# Patient Record
Sex: MALE | Race: WHITE | Employment: FULL TIME | URBAN - METROPOLITAN AREA
[De-identification: names, ages, dates, MRNs, and addresses within clinical notes are randomized per-mention and may not be internally consistent; named-entity substitution may affect disease eponyms.]

---

## 2021-09-18 VITALS — WEIGHT: 264.6 LBS | HEIGHT: 78 IN | BODY MASS INDEX: 30.61 KG/M2

## 2021-09-18 PROBLEM — K62.5 HEMORRHAGE OF ANUS AND RECTUM: Status: ACTIVE | Noted: 2021-09-18

## 2021-09-18 PROBLEM — N23 RENAL COLIC: Status: ACTIVE | Noted: 2021-09-18

## 2021-09-18 PROBLEM — K64.9 HEMORRHOIDS: Status: ACTIVE | Noted: 2021-09-18

## 2021-09-18 PROBLEM — L65.9 ALOPECIA: Status: ACTIVE | Noted: 2021-09-18

## 2021-09-18 PROBLEM — R10.13 EPIGASTRIC PAIN: Status: ACTIVE | Noted: 2021-09-18

## 2021-09-18 RX ORDER — HYDROCORTISONE ACETATE 25 MG/1
SUPPOSITORY RECTAL
COMMUNITY
Start: 2021-07-20

## 2021-09-18 RX ORDER — MONTELUKAST SODIUM 10 MG/1
10 TABLET ORAL DAILY
COMMUNITY
Start: 2021-09-09

## 2021-09-24 ENCOUNTER — OFFICE VISIT (OUTPATIENT)
Dept: GASTROENTEROLOGY | Age: 51
End: 2021-09-24
Payer: COMMERCIAL

## 2021-09-24 VITALS
HEART RATE: 73 BPM | RESPIRATION RATE: 14 BRPM | WEIGHT: 264.2 LBS | SYSTOLIC BLOOD PRESSURE: 130 MMHG | OXYGEN SATURATION: 97 % | HEIGHT: 78 IN | BODY MASS INDEX: 30.57 KG/M2 | TEMPERATURE: 97.8 F | DIASTOLIC BLOOD PRESSURE: 80 MMHG

## 2021-09-24 DIAGNOSIS — K21.9 GASTROESOPHAGEAL REFLUX DISEASE, UNSPECIFIED WHETHER ESOPHAGITIS PRESENT: ICD-10-CM

## 2021-09-24 DIAGNOSIS — Z12.11 COLON CANCER SCREENING: ICD-10-CM

## 2021-09-24 DIAGNOSIS — K64.9 HEMORRHOIDS, UNSPECIFIED HEMORRHOID TYPE: Primary | ICD-10-CM

## 2021-09-24 DIAGNOSIS — K62.5 HEMORRHAGE OF ANUS AND RECTUM: ICD-10-CM

## 2021-09-24 PROCEDURE — 99214 OFFICE O/P EST MOD 30 MIN: CPT | Performed by: INTERNAL MEDICINE

## 2021-09-24 RX ORDER — OMEPRAZOLE 40 MG/1
40 CAPSULE, DELAYED RELEASE ORAL DAILY
Qty: 30 CAPSULE | Refills: 5 | Status: SHIPPED | OUTPATIENT
Start: 2021-09-24

## 2021-09-24 RX ORDER — POLYETHYLENE GLYCOL 3350 17 G/17G
POWDER, FOR SOLUTION ORAL
Qty: 510 G | Refills: 0 | Status: SHIPPED | OUTPATIENT
Start: 2021-09-24 | End: 2021-10-19

## 2021-09-24 NOTE — PROGRESS NOTES
1. Have you been to the ER, urgent care clinic since your last visit? Hospitalized since your last visit? no    2. Have you seen or consulted any other health care providers outside of the [address] [place] [address] since your last visit? Include any pap smears or colon screening.   NO   Chief Complaint   Patient presents with    Hemorrhoids     Visit Vitals  /80 (BP 1 Location: Left upper arm, BP Patient Position: Sitting, BP Cuff Size: Adult)   Pulse 73   Temp 97.8 °F (36.6 °C) (Temporal)   Resp 14   Ht 6' 6\" (1.981 m)   Wt 119.8 kg (264 lb 3.2 oz)   SpO2 97%   BMI 30.53 kg/m²

## 2021-09-24 NOTE — PROGRESS NOTES
COLONOSCOPY IS SCHEDULED FOR 10- AT 9:00 AM.   COVID TEST IS SCHEDULED FOR 10-. NO PA REQUIRED  FOR COLONOSCOPY, NO AGE LIMIT PER [facility] REF # [id number]. ON 9-.

## 2021-09-26 NOTE — PROGRESS NOTES
Sada Valenzuela is a 54 y.o. male who presents today for the following:  Chief Complaint   Patient presents with    Hemorrhoids         Allergies   Allergen Reactions    Ibuprofen Unknown (comments)       Current Outpatient Medications   Medication Sig    polyethylene glycol (MIRALAX) 17 gram/dose powder Use as directed  Indications: emptying of the bowel    omeprazole (PRILOSEC) 40 mg capsule Take 1 Capsule by mouth daily. Indications: gastroesophageal reflux disease    Anucort-HC 25 mg supp INSERT 1 SUPPOSITORY RECTALLY EVERY DAY AT BEDTIME    montelukast (SINGULAIR) 10 mg tablet Take 10 mg by mouth daily. No current facility-administered medications for this visit. Past Medical History:   Diagnosis Date    Alopecia 9/18/2021    Epigastric pain 9/18/2021    Hemorrhage of anus and rectum 9/18/2021    Hemorrhoids 1/77/2736    Renal colic 0/39/2879       History reviewed. No pertinent surgical history. Family History   Problem Relation Age of Onset    Diabetes Mother     Cancer Father     Diabetes Sister     High Cholesterol Brother        Social History     Socioeconomic History    Marital status: SINGLE     Spouse name: Not on file    Number of children: Not on file    Years of education: Not on file    Highest education level: Not on file   Occupational History    Not on file   Tobacco Use    Smoking status: Never Smoker    Smokeless tobacco: Never Used   Vaping Use    Vaping Use: Never used   Substance and Sexual Activity    Alcohol use: Never    Drug use: Never    Sexual activity: Not on file   Other Topics Concern    Not on file   Social History Narrative    Not on file     Social Determinants of Health     Financial Resource Strain:     Difficulty of Paying Living Expenses:    Food Insecurity:     Worried About Running Out of Food in the Last Year:     [address] in the Last Year:    Transportation Needs:     Lack of Transportation (Medical):      Lack of Transportation (Non-Medical):    Physical Activity:     Days of Exercise per Week:     Minutes of Exercise per Session:    Stress:     Feeling of Stress :    Social Connections:     Frequency of Communication with Friends and Family:     Frequency of Social Gatherings with Friends and Family:     Attends Jain Services:     Active Member of Clubs or Organizations:     Attends Club or Organization Meetings:     Marital Status:    Intimate Partner Violence:     Fear of Current or Ex-Partner:     Emotionally Abused:     Physically Abused:     Sexually Abused:          HPI  81-year-old male with history of gastroesophageal reflux disease who comes in for evaluation of hemorrhoids. Patient states she has bad hemorrhoids for the last month. He has had some rectal bleeding at times which is bright red blood. There is associated burning and itching in the anus. He saw his PCP and given hemorrhoidal suppositories which has helped some. Patient states he has never had a colonoscopy to verify presence of hemorrhoids. Patient states he also has a sour stomach on eating late, especially with certain foods. He does take Metamucil which has made his stools more bulky and soft and less irritating. Patient states he can drink sodas or eat spicy foods without causing discomfort in his anus. Review of Systems   Constitutional: Negative. HENT: Negative. Negative for nosebleeds. Eyes: Negative. Respiratory: Negative. Cardiovascular: Negative. Gastrointestinal: Positive for heartburn. Negative for abdominal pain, blood in stool, constipation, diarrhea, melena, nausea and vomiting. Genitourinary: Negative. Musculoskeletal: Negative. Skin: Negative. Neurological: Negative. Endo/Heme/Allergies: Negative. Psychiatric/Behavioral: Negative. All other systems reviewed and are negative.         Visit Vitals  /80 (BP 1 Location: Left upper arm, BP Patient Position: Sitting, BP Cuff Size: Adult)   Pulse 73   Temp 97.8 °F (36.6 °C) (Temporal)   Resp 14   Ht 6' 6\" (1.981 m)   Wt 119.8 kg (264 lb 3.2 oz)   SpO2 97%   BMI 30.53 kg/m²     Physical Exam  Vitals and nursing note reviewed. Constitutional:       Appearance: Normal appearance. He is obese. HENT:      Head: Normocephalic and atraumatic. Nose: Nose normal.      Mouth/Throat:      Mouth: Mucous membranes are moist.      Pharynx: Oropharynx is clear. Eyes:      General: No scleral icterus. Extraocular Movements: Extraocular movements intact. Conjunctiva/sclera: Conjunctivae normal.      Pupils: Pupils are equal, round, and reactive to light. Cardiovascular:      Rate and Rhythm: Normal rate and regular rhythm. Heart sounds: Normal heart sounds. Pulmonary:      Effort: Pulmonary effort is normal.      Breath sounds: Normal breath sounds. Abdominal:      General: Bowel sounds are normal. There is no distension. Palpations: Abdomen is soft. There is no mass. Tenderness: There is no abdominal tenderness. There is no right CVA tenderness, left CVA tenderness, guarding or rebound. Hernia: No hernia is present. Musculoskeletal:         General: Normal range of motion. Cervical back: Normal range of motion and neck supple. Skin:     General: Skin is warm and dry. Coloration: Skin is not jaundiced. Neurological:      General: No focal deficit present. Mental Status: He is alert and oriented to person, place, and time. Psychiatric:         Mood and Affect: Mood normal.         Behavior: Behavior normal.         Thought Content: Thought content normal.         Judgment: Judgment normal.            1. Colon cancer screening  Patient has never had a colonoscopy and is over 45 so this time for screening exam.  - COLONOSCOPY,DIAGNOSTIC; Future  - polyethylene glycol (MIRALAX) 17 gram/dose powder; Use as directed  Indications: emptying of the bowel  Dispense: 510 g; Refill: 0    2. Hemorrhoids, unspecified hemorrhoid type      3. Hemorrhage of anus and rectum  Most likely secondary to hemorrhoidal causes but that must be ruled out. 4. Gastroesophageal reflux disease, unspecified whether esophagitis present  We will start patient on a daily PPI for his GERD symptoms. - omeprazole (PRILOSEC) 40 mg capsule; Take 1 Capsule by mouth daily. Indications: gastroesophageal reflux disease  Dispense: 30 Capsule;  Refill: 5

## 2021-09-26 NOTE — H&P (VIEW-ONLY)
Allen Palomares is a 54 y.o. male who presents today for the following:  Chief Complaint   Patient presents with    Hemorrhoids         Allergies   Allergen Reactions    Ibuprofen Unknown (comments)       Current Outpatient Medications   Medication Sig    polyethylene glycol (MIRALAX) 17 gram/dose powder Use as directed  Indications: emptying of the bowel    omeprazole (PRILOSEC) 40 mg capsule Take 1 Capsule by mouth daily. Indications: gastroesophageal reflux disease    Anucort-HC 25 mg supp INSERT 1 SUPPOSITORY RECTALLY EVERY DAY AT BEDTIME    montelukast (SINGULAIR) 10 mg tablet Take 10 mg by mouth daily. No current facility-administered medications for this visit. Past Medical History:   Diagnosis Date    Alopecia 9/18/2021    Epigastric pain 9/18/2021    Hemorrhage of anus and rectum 9/18/2021    Hemorrhoids 7/13/8567    Renal colic 8/72/1472       History reviewed. No pertinent surgical history. Family History   Problem Relation Age of Onset    Diabetes Mother     Cancer Father     Diabetes Sister     High Cholesterol Brother        Social History     Socioeconomic History    Marital status: SINGLE     Spouse name: Not on file    Number of children: Not on file    Years of education: Not on file    Highest education level: Not on file   Occupational History    Not on file   Tobacco Use    Smoking status: Never Smoker    Smokeless tobacco: Never Used   Vaping Use    Vaping Use: Never used   Substance and Sexual Activity    Alcohol use: Never    Drug use: Never    Sexual activity: Not on file   Other Topics Concern    Not on file   Social History Narrative    Not on file     Social Determinants of Health     Financial Resource Strain:     Difficulty of Paying Living Expenses:    Food Insecurity:     Worried About Running Out of Food in the Last Year:     [address] in the Last Year:    Transportation Needs:     Lack of Transportation (Medical):      Lack of Transportation (Non-Medical):    Physical Activity:     Days of Exercise per Week:     Minutes of Exercise per Session:    Stress:     Feeling of Stress :    Social Connections:     Frequency of Communication with Friends and Family:     Frequency of Social Gatherings with Friends and Family:     Attends [employer] Services:     Active Member of Clubs or Organizations:     Attends Club or Organization Meetings:     Marital Status:    Intimate Partner Violence:     Fear of Current or Ex-Partner:     Emotionally Abused:     Physically Abused:     Sexually Abused:          HPI  43-year-old male with history of gastroesophageal reflux disease who comes in for evaluation of hemorrhoids. Patient states she has bad hemorrhoids for the last month. He has had some rectal bleeding at times which is bright red blood. There is associated burning and itching in the anus. He saw his PCP and given hemorrhoidal suppositories which has helped some. Patient states he has never had a colonoscopy to verify presence of hemorrhoids. Patient states he also has a sour stomach on eating late, especially with certain foods. He does take Metamucil which has made his stools more bulky and soft and less irritating. Patient states he can drink sodas or eat spicy foods without causing discomfort in his anus. Review of Systems   Constitutional: Negative. HENT: Negative. Negative for nosebleeds. Eyes: Negative. Respiratory: Negative. Cardiovascular: Negative. Gastrointestinal: Positive for heartburn. Negative for abdominal pain, blood in stool, constipation, diarrhea, melena, nausea and vomiting. Genitourinary: Negative. Musculoskeletal: Negative. Skin: Negative. Neurological: Negative. Endo/Heme/Allergies: Negative. Psychiatric/Behavioral: Negative. All other systems reviewed and are negative.         Visit Vitals  /80 (BP 1 Location: Left upper arm, BP Patient Position: Sitting, BP Cuff Size: Adult)   Pulse 73   Temp 97.8 °F (36.6 °C) (Temporal)   Resp 14   Ht 6' 6\" (1.981 m)   Wt 119.8 kg (264 lb 3.2 oz)   SpO2 97%   BMI 30.53 kg/m²     Physical Exam  Vitals and nursing note reviewed. Constitutional:       Appearance: Normal appearance. He is obese. HENT:      Head: Normocephalic and atraumatic. Nose: Nose normal.      Mouth/Throat:      Mouth: Mucous membranes are moist.      Pharynx: Oropharynx is clear. Eyes:      General: No scleral icterus. Extraocular Movements: Extraocular movements intact. Conjunctiva/sclera: Conjunctivae normal.      Pupils: Pupils are equal, round, and reactive to light. Cardiovascular:      Rate and Rhythm: Normal rate and regular rhythm. Heart sounds: Normal heart sounds. Pulmonary:      Effort: Pulmonary effort is normal.      Breath sounds: Normal breath sounds. Abdominal:      General: Bowel sounds are normal. There is no distension. Palpations: Abdomen is soft. There is no mass. Tenderness: There is no abdominal tenderness. There is no right CVA tenderness, left CVA tenderness, guarding or rebound. Hernia: No hernia is present. Musculoskeletal:         General: Normal range of motion. Cervical back: Normal range of motion and neck supple. Skin:     General: Skin is warm and dry. Coloration: Skin is not jaundiced. Neurological:      General: No focal deficit present. Mental Status: He is alert and oriented to person, place, and time. Psychiatric:         Mood and Affect: Mood normal.         Behavior: Behavior normal.         Thought Content: Thought content normal.         Judgment: Judgment normal.            1. Colon cancer screening  Patient has never had a colonoscopy and is over 47 so this time for screening exam.  - COLONOSCOPY,DIAGNOSTIC; Future  - polyethylene glycol (MIRALAX) 17 gram/dose powder; Use as directed  Indications: emptying of the bowel  Dispense: 510 g; Refill: 0    2. Hemorrhoids, unspecified hemorrhoid type      3. Hemorrhage of anus and rectum  Most likely secondary to hemorrhoidal causes but that must be ruled out. 4. Gastroesophageal reflux disease, unspecified whether esophagitis present  We will start patient on a daily PPI for his GERD symptoms. - omeprazole (PRILOSEC) 40 mg capsule; Take 1 Capsule by mouth daily. Indications: gastroesophageal reflux disease  Dispense: 30 Capsule;  Refill: 5

## 2021-10-15 ENCOUNTER — HOSPITAL ENCOUNTER (OUTPATIENT)
Dept: PREADMISSION TESTING | Age: 51
Discharge: HOME | End: 2021-10-15
Attending: INTERNAL MEDICINE
Payer: COMMERCIAL

## 2021-10-15 LAB — SARS-COV-2, COV2: NORMAL

## 2021-10-15 PROCEDURE — U0005 INFEC AGEN DETEC AMPLI PROBE: HCPCS

## 2021-10-17 LAB
SARS-COV-2, XPLCVT: NOT DETECTED
SOURCE, COVRS: NORMAL

## 2021-10-19 ENCOUNTER — ANESTHESIA EVENT (OUTPATIENT)
Dept: ENDOSCOPY | Age: 51
End: 2021-10-19
Payer: COMMERCIAL

## 2021-10-19 ENCOUNTER — HOSPITAL ENCOUNTER (OUTPATIENT)
Age: 51
Setting detail: OUTPATIENT SURGERY
Discharge: HOME | End: 2021-10-19
Attending: INTERNAL MEDICINE | Admitting: INTERNAL MEDICINE
Payer: COMMERCIAL

## 2021-10-19 ENCOUNTER — ANESTHESIA (OUTPATIENT)
Dept: ENDOSCOPY | Age: 51
End: 2021-10-19
Payer: COMMERCIAL

## 2021-10-19 VITALS
BODY MASS INDEX: 30.58 KG/M2 | RESPIRATION RATE: 18 BRPM | HEART RATE: 60 BPM | OXYGEN SATURATION: 100 % | HEIGHT: 77 IN | SYSTOLIC BLOOD PRESSURE: 140 MMHG | DIASTOLIC BLOOD PRESSURE: 86 MMHG | TEMPERATURE: 97.9 F | WEIGHT: 259 LBS

## 2021-10-19 PROCEDURE — 45378 DIAGNOSTIC COLONOSCOPY: CPT | Performed by: INTERNAL MEDICINE

## 2021-10-19 PROCEDURE — 74011000258 HC RX REV CODE- 258: Performed by: NURSE ANESTHETIST, CERTIFIED REGISTERED

## 2021-10-19 PROCEDURE — 74011250636 HC RX REV CODE- 250/636: Performed by: NURSE ANESTHETIST, CERTIFIED REGISTERED

## 2021-10-19 PROCEDURE — 76060000032 HC ANESTHESIA 0.5 TO 1 HR: Performed by: INTERNAL MEDICINE

## 2021-10-19 PROCEDURE — 2709999900 HC NON-CHARGEABLE SUPPLY: Performed by: INTERNAL MEDICINE

## 2021-10-19 PROCEDURE — 74011250636 HC RX REV CODE- 250/636: Performed by: INTERNAL MEDICINE

## 2021-10-19 PROCEDURE — 76040000007: Performed by: INTERNAL MEDICINE

## 2021-10-19 PROCEDURE — 74011000250 HC RX REV CODE- 250: Performed by: NURSE ANESTHETIST, CERTIFIED REGISTERED

## 2021-10-19 RX ORDER — SODIUM CHLORIDE 9 MG/ML
50 INJECTION, SOLUTION INTRAVENOUS CONTINUOUS
Status: DISCONTINUED | OUTPATIENT
Start: 2021-10-19 | End: 2021-10-19 | Stop reason: HOSPADM

## 2021-10-19 RX ORDER — SODIUM CHLORIDE 0.9 % (FLUSH) 0.9 %
5-40 SYRINGE (ML) INJECTION AS NEEDED
Status: DISCONTINUED | OUTPATIENT
Start: 2021-10-19 | End: 2021-10-19 | Stop reason: HOSPADM

## 2021-10-19 RX ORDER — PROPOFOL 10 MG/ML
INJECTION, EMULSION INTRAVENOUS AS NEEDED
Status: DISCONTINUED | OUTPATIENT
Start: 2021-10-19 | End: 2021-10-19 | Stop reason: HOSPADM

## 2021-10-19 RX ORDER — LIDOCAINE HYDROCHLORIDE 20 MG/ML
INJECTION, SOLUTION INFILTRATION; PERINEURAL AS NEEDED
Status: DISCONTINUED | OUTPATIENT
Start: 2021-10-19 | End: 2021-10-19 | Stop reason: HOSPADM

## 2021-10-19 RX ORDER — SODIUM CHLORIDE 9 MG/ML
INJECTION, SOLUTION INTRAVENOUS
Status: DISCONTINUED | OUTPATIENT
Start: 2021-10-19 | End: 2021-10-19 | Stop reason: HOSPADM

## 2021-10-19 RX ORDER — SODIUM CHLORIDE 9 MG/ML
125 INJECTION, SOLUTION INTRAVENOUS CONTINUOUS
Status: DISCONTINUED | OUTPATIENT
Start: 2021-10-19 | End: 2021-10-19 | Stop reason: HOSPADM

## 2021-10-19 RX ORDER — SODIUM CHLORIDE 0.9 % (FLUSH) 0.9 %
5-40 SYRINGE (ML) INJECTION EVERY 8 HOURS
Status: DISCONTINUED | OUTPATIENT
Start: 2021-10-19 | End: 2021-10-19 | Stop reason: HOSPADM

## 2021-10-19 RX ADMIN — LIDOCAINE HYDROCHLORIDE 60 MG: 20 INJECTION, SOLUTION INFILTRATION; PERINEURAL at 09:41

## 2021-10-19 RX ADMIN — PROPOFOL 50 MG: 10 INJECTION, EMULSION INTRAVENOUS at 09:54

## 2021-10-19 RX ADMIN — SODIUM CHLORIDE 50 ML/HR: 9 INJECTION, SOLUTION INTRAVENOUS at 08:42

## 2021-10-19 RX ADMIN — PROPOFOL 50 MG: 10 INJECTION, EMULSION INTRAVENOUS at 10:05

## 2021-10-19 RX ADMIN — PROPOFOL 50 MG: 10 INJECTION, EMULSION INTRAVENOUS at 10:01

## 2021-10-19 RX ADMIN — SODIUM CHLORIDE: 9 INJECTION, SOLUTION INTRAVENOUS at 09:41

## 2021-10-19 RX ADMIN — PROPOFOL 50 MG: 10 INJECTION, EMULSION INTRAVENOUS at 09:50

## 2021-10-19 RX ADMIN — PROPOFOL 100 MG: 10 INJECTION, EMULSION INTRAVENOUS at 09:51

## 2021-10-19 NOTE — OP NOTES
Colonoscopy Procedure Note      Patient: Sariah Lyman MRN: [id number]  SSN: [id number]    YOB: 1970  Age: 54 y.o. Sex: male      Date of Procedure: 10/19/2021  Date/Time:  10/19/2021 10:15 AM       IMPRESSION:     1. Internal hemorrhoids (grade 2)   2. Otherwise normal colon to cecum         RECOMMENDATIONS:     1) Patient should have repeat colonoscopy in 10 years. 2) May continue to use Anusol AC cream as needed. INDICATION: Colon screening     PROCEDURE PERFORMED: Colonoscopy      DESCRIPTION OF PROCEDURE: An informed consent was obtained. The patient was placed in left lateral position. Perianal inspection and a digital rectal exam was performed. Video colonoscope was introduced into the rectum and advanced under direct vision up to the terminal ileum. With adequate insufflation and maneuvering of the withdrawing scope, the colonic mucosa was visualized carefully. Retroflexion was performed in the rectum to see the anorectum and also in the ascending colon to look behind the folds. Vital signs, pulse oximetry, single lead cardiac monitor were monitored throughout the procedure as the sedation was titrated to the desired effect ensuring patient comfort and safety. The patient tolerated the procedure very well and was transferred to the recovery area. Following is the summary of findings: As removed the scope through the anal canal we saw some mild inflamed internal hemorrhoids. There was no active bleeding. Colon was otherwise unremarkable to the level of the cecum. ENDOSCOPIST: Rigoberto Diop MD      ENDOSCOPE: Olympus videocolonoscope     ASSISTANT:Circ-1: Ting January              Scrub Tech-1: Tabitha Harris     ANESTHESIA: TIVA      QUALITY OF PREPARATION: Good      FINDINGS:   1. Internal hemorrhoids (grade 2)  2.  Otherwise normal colon to cecum       Complications: None     EBL: None     SPECIMENS: * No specimens in log *          Prateek Alfredo MD  October 19, 2021  10:15 AM

## 2021-10-19 NOTE — DISCHARGE INSTRUCTIONS

## 2021-10-19 NOTE — ANESTHESIA PREPROCEDURE EVALUATION
Relevant Problems   No relevant active problems       Anesthetic History   No history of anesthetic complications  Other anesthesia complications          Review of Systems / Medical History  Patient summary reviewed, nursing notes reviewed and pertinent labs reviewed    Pulmonary  Within defined limits                 Neuro/Psych   Within defined limits           Cardiovascular  Within defined limits                     GI/Hepatic/Renal  Within defined limits              Endo/Other  Within defined limits           Other Findings              Physical Exam    Airway  Mallampati: II  TM Distance: 4 - 6 cm  Neck ROM: normal range of motion        Cardiovascular    Rhythm: regular  Rate: normal         Dental  No notable dental hx       Pulmonary  Breath sounds clear to auscultation               Abdominal  Abdominal exam normal       Other Findings            Anesthetic Plan    ASA: 1  Anesthesia type: total IV anesthesia            Anesthetic plan and risks discussed with: Patient

## 2021-10-19 NOTE — INTERVAL H&P NOTE
Update History & Physical    The Patient's History and Physical of October 19, 2021,  was reviewed with the patient and I examined the patient. There was no hange. The surgical site was confirmed by the patient and me. Plan:  The risk, benefits, expected outcome, and alternative to the recommended procedure have been discussed with the patient. Patient understands and wants to proceed with the procedure.     Electronically signed by Shelly Stark MD on 10/19/2021 at 9:39 AM

## 2021-10-19 NOTE — ANESTHESIA POSTPROCEDURE EVALUATION
Procedure(s):  COLONOSCOPY (TIVA).     total IV anesthesia    Anesthesia Post Evaluation      Multimodal analgesia: multimodal analgesia not used between 6 hours prior to anesthesia start to PACU discharge  Patient location during evaluation: PACU  Patient participation: complete - patient participated  Pain score: 0  Pain management: adequate  Anesthetic complications: no  Cardiovascular status: acceptable  Respiratory status: acceptable  Hydration status: acceptable  Post anesthesia nausea and vomiting:  none  Final Post Anesthesia Temperature Assessment:  Normothermia (36.0-37.5 degrees C)      INITIAL Post-op Vital signs:   Vitals Value Taken Time   /67 10/19/21 1014   Temp 36.6 °C (97.9 °F) 10/19/21 1014   Pulse 59 10/19/21 1014   Resp 20 10/19/21 1014   SpO2 95 % 10/19/21 1014

## 2022-03-18 PROBLEM — K62.5 HEMORRHAGE OF ANUS AND RECTUM: Status: ACTIVE | Noted: 2021-09-18

## 2022-03-19 PROBLEM — L65.9 ALOPECIA: Status: ACTIVE | Noted: 2021-09-18

## 2022-03-19 PROBLEM — R10.13 EPIGASTRIC PAIN: Status: ACTIVE | Noted: 2021-09-18

## 2022-03-19 PROBLEM — N23 RENAL COLIC: Status: ACTIVE | Noted: 2021-09-18

## 2022-03-20 PROBLEM — K64.9 HEMORRHOIDS: Status: ACTIVE | Noted: 2021-09-18

## 2022-12-07 ENCOUNTER — HOSPITAL ENCOUNTER (OUTPATIENT)
Dept: PHYSICAL THERAPY | Age: 52
Discharge: HOME | End: 2022-12-07
Payer: COMMERCIAL

## 2022-12-07 PROCEDURE — 97110 THERAPEUTIC EXERCISES: CPT

## 2022-12-07 PROCEDURE — 97161 PT EVAL LOW COMPLEX 20 MIN: CPT

## 2022-12-07 NOTE — THERAPY EVALUATION
PT INITIAL EVALUATION NOTE 2-15    Patient Name: Cristina Round  Date:2022  : 1970  [x]  Patient  Verified  Payor: Ector Pat / Plan: Mortimer Laity / Product Type: HMO /    In time:1:50  Out time:2:40  Total Treatment Time (min): 50  Visit #: 1     Treatment Area: DDD (degenerative disc disease), lumbar [M51.36]  Lumbar radiculopathy [M54.16]    SUBJECTIVE  Pain Level (0-10 scale): 5/10  Any medication changes, allergies to medications, adverse drug reactions, diagnosis change, or new procedure performed?: [] No    [x] Yes (see summary sheet for update)  Subjective:     Patient is a 51 yo male who presents with numbness in his feet that started 2-3 months ago mainly in his lateral 3 toes. He also has some back pain on the L side. He reports that he can sit for about 2 hours before he needs to get up. He reports that he feels like he needs to get back into shape. He will return to [place] on 2022. He reports that he will wake with pain and numbness in his feet. PLOF: Independent with all ADL's; no use of AD  Mechanism of Injury: insidious  Previous Treatment/Compliance: medication  Radiographs: x-ray: 2022 is personally reviewed demonstrate degenerative changes throughout the lumbar spine with anterior superior and inferior endplate spurring. What increases symptoms: sitting for long time  What decreases symptoms: medicine; stretching  Work Hx: [employer]  Living Situation: lives by himself   Pt Goals: \" I want the pain gone and numbness decreased. \"  Barriers: none  Motivation: Good  Substance use: None reported  Cognition: A&O x 4  Fall Assessment: No falls risk assessment indicated at this time.   Past Medical History:  Past Medical History:   Diagnosis Date    Alopecia 2021    Epigastric pain 2021    Hemorrhage of anus and rectum 2021    Hemorrhoids 8376    Renal colic      Past Surgical History:  Past Surgical History:   Procedure Laterality Date COLONOSCOPY N/A 10/19/2021    COLONOSCOPY (TIVA) performed by Keny Bajwa MD at [facility] ENDOSCOPY     Current Medications:  Current Outpatient Medications   Medication Instructions    Anucort-HC 25 mg supp INSERT 1 SUPPOSITORY RECTALLY EVERY DAY AT BEDTIME    montelukast (SINGULAIR) 10 mg, Oral, DAILY    omeprazole (PRILOSEC) 40 mg, Oral, DAILY     Allergies: Allergies   Allergen Reactions    Ibuprofen Unknown (comments)          OBJECTIVE/EXAMINATION    OBJECTIVE  Posture:  normal  Gait and Functional Mobility:  normal gait pattern  Palpation: tenderness in bilateral paraspinal muscles with L being worse than R   Sensation: slight decrease in middle toe         Lumbar AROM:      Flexion             80 degrees      Extension            40 degrees                     L                    R  Side Bending   30 degrees  25 degrees    Rotation   55 degrees  60 degrees        Manual Muscle Testing    L   R  Hip Flexion                   4/5               5/5  Knee Extension            5/5                5/5  Knee Flexion                5/5  5/5  Ankle PF   5/5  5/5  Ankle DF   5/5  5/5     Special Tests:  FABERS: POS L   Slump: POS L   SI Compression/Distraction: NEG     10 min Therapeutic Exercise:  [x] See flow sheet :   Rationale: increase ROM, increase strength, and improve coordination to improve the patients ability to perform daily tasks with less irritation in lumbar spine. With   [] TE   [] TA   [] neuro   [] other: Patient Education: [x] Provided HEP    [] Progressed/Changed HEP based on:   [] positioning   [] body mechanics   [] transfers   [] heat/ice application    [] other:        Pain Level (0-10 scale) post treatment: 5/10      ASSESSMENT:    [x]  See Plan of [address], [place], DPT 12/7/2022       Short Term Goals: To be accomplished in 5 treatments. Patient will be independent with HEP to improve carryover between treatment sessions.    Patient will improve lumbar flexion by 5 deg to improve ability to pick objects off the floor and return to standing with less than 3/10 pain. Patient will be able to stand for 30 min to be able to perform work tasks with less than 3/10 pain. Long Term Goals: To be accomplished in 10  treatments. Patient will be able to stand for 2 hours without pain for work tasks. Patient will be able to sit for 2 hours to be able to travel for appointments and to and from work. Patient will be able to sleep through the whole night without waking from pain. Patient will be able to pick boxes and objects from the floor at work without pain. Frequency / Duration: Patient to be seen 2 times per week for 10 treatments. Patient/ Caregiver education and instruction: self care, activity modification, and exercises    [x]  Plan of care has been reviewed with SHASTA Alberts PT, DPT 2022     ________________________________________________________________________    I certify that the above Therapy Services are being furnished while the patient is under my care. I agree with the treatment plan and certify that this therapy is necessary.     [de-identified] Signature:____________________  Date:____________Time: _________                                        Roya Friend, DO  Please sign and return to:   [facility]  [address] [place] [address] Jesusita Telles  Ph: [phone]    Fax: [phone]    Patient name: Nickolas Hauser  : 1970  Provider#: [id number]

## 2022-12-07 NOTE — THERAPY EVALUATION
[address]  [place], [address]  Ph: [phone]    Fax: [phone]    Plan of Care/Statement of Necessity for Physical Therapy Services  2-15    Patient name: Jorge A Barnes  : 1970  Provider#: [id number]  Referral source: Nahed Bryan DO      Medical/Treatment Diagnosis: DDD (degenerative disc disease), lumbar [M51.36]  Lumbar radiculopathy [M54.16]     Prior Hospitalization: see medical history     Comorbidities: see medical history  Prior Level of Function: Independent with all ADL's; no use of AD  Medications: Verified on Patient Summary List    Start of Care: 2022      Onset Date: 2022       The Plan of Care and following information is based on the information from the initial evaluation. Assessment/ key information:   Patient is a 51 yo male who presents with lower back pain on the L side with radicular symptoms in LLE. He also has neuropathy in bilateral feet in toes 3-5. He has tightness in lumbar paraspinals and in his L glute zeina piriformis. We started HEP that will initiate some core strengthening and also improve flexibility. He will benefit from skilled PT services to increase ROM, improve core strength, and allow him to perform daily tasks with less irritation in lumbar spine.      Evaluation Complexity History LOW Complexity : Zero comorbidities / personal factors that will impact the outcome / POC; Examination LOW Complexity : 1-2 Standardized tests and measures addressing body structure, function, activity limitation and / or participation in recreation  ;Presentation LOW Complexity : Stable, uncomplicated  ;Clinical Decision Making TUG Score: n/a  Overall Complexity Rating: LOW     Problem List: pain affecting function, decrease ROM, decrease strength, edema affecting function, impaired gait/ balance, decrease ADL/ functional abilitiies, decrease activity tolerance, decrease flexibility/ joint mobility, and decrease transfer abilities   Treatment Plan may include any combination of the following: Therapeutic exercise, Neuromuscular reeducation, Manual therapy, Therapeutic activity, Self care/home management, Electric stim unattended , Vasopneumatic device, Gait training, Ultrasound, and Mechanical traction  Patient / Family readiness to learn indicated by: asking questions, trying to perform skills, and interest  Persons(s) to be included in education: patient (P)  Barriers to Learning/Limitations: None  Patient Goal (s): \" I want the pain gone and numbness decreased. \"  Patient Self Reported Health Status: good  Rehabilitation Potential: good                      Short Term Goals: To be accomplished in 5 treatments. Patient will be independent with HEP to improve carryover between treatment sessions. Patient will improve lumbar flexion by 5 deg to improve ability to pick objects off the floor and return to standing with less than 3/10 pain. Patient will be able to stand for 30 min to be able to perform work tasks with less than 3/10 pain. Long Term Goals: To be accomplished in 10  treatments. Patient will be able to stand for 2 hours without pain for work tasks. Patient will be able to sit for 2 hours to be able to travel for appointments and to and from work. Patient will be able to sleep through the whole night without waking from pain. Patient will be able to pick boxes and objects from the floor at work without pain. Frequency / Duration: Patient to be seen 2 times per week for 10 treatments.      Patient/ Caregiver education and instruction: self care, activity modification, and exercises     [x]  Plan of care has been reviewed with SHASTA Kirkpatrick PT, DPT 12/7/2022

## 2022-12-08 ENCOUNTER — HOSPITAL ENCOUNTER (OUTPATIENT)
Dept: PHYSICAL THERAPY | Age: 52
Discharge: HOME | End: 2022-12-08
Payer: COMMERCIAL

## 2022-12-08 PROCEDURE — 97014 ELECTRIC STIMULATION THERAPY: CPT

## 2022-12-08 PROCEDURE — 97110 THERAPEUTIC EXERCISES: CPT

## 2022-12-08 PROCEDURE — 97012 MECHANICAL TRACTION THERAPY: CPT

## 2022-12-08 NOTE — PROGRESS NOTES
PT DAILY TREATMENT NOTE 2-15    Patient Name: Jorge A Barnes  Date:2022  : 1970  [x]  Patient  Verified  Payor: Eric Quintana / Plan: Abi Liao / Product Type: HMO /    In time: 7:28  Out time: 8:35  Total Treatment Time (min): 79  Visit #:  2    Treatment Area: DDD (degenerative disc disease), lumbar [M51.36]  Lumbar radiculopathy [M54.16]    SUBJECTIVE  Pain Level (0-10 scale): 2/10  Any medication changes, allergies to medications, adverse drug reactions, diagnosis change, or new procedure performed?: [x] No    [] Yes (see summary sheet for update)  Subjective functional status/changes:     Pt states he did his stretches last night. Medicine is helping the pain.     OBJECTIVE    Modality rationale: decrease pain and increase tissue extensibility to improve the patients ability to decrease nerve compression   Min Type Additional Details      19 [x] Estim: []Att   [x]Unatt    []TENS instruct                  [x]IFC  []Premod   []NMES                    []Other:  []w/US      [x]w/ heat  []w/ ice  Position: w/ traction  Location: low back        19 [x]  Traction: [] Cervical       [x]Lumbar                       [] Prone          [x]Supine                       [x]Intermittent   []Continuous Lbs: 60# maximal 40# minimal  [] before manual  [] after manual  [x] w/ heat  [x] Simultaneously performed with w/ Estim    []  Ultrasound: []Continuous   [] Pulsed                       at: []1MHz   []3MHz Location:  W/cm2:    [] Paraffin         Location:   []w/heat    []  Ice     []  Heat  []  Ice massage Position:  Location:    []  Laser  []  Other: Position:  Location:      []  Vasopneumatic Device Pressure:       [] lo [] med [] hi   [] w/ ice      Temperature:   [] Simultaneously performed with w/ Estim     [x] Skin assessment post-treatment:  [x]intact [x]redness- no adverse reaction    []redness - adverse reaction:       48 min Therapeutic Exercise:  [x] See flow sheet :   Rationale: increase ROM and increase strength to improve the patients ability to improve functional mobility          With   [x] TE   [] TA   [] neuro   [] other: Patient Education: [x] Review HEP    [] Progressed/Changed HEP based on:   [] positioning   [] body mechanics   [] transfers   [] heat/ice application    [] other:      Other Objective/Functional Measures: Initiated ROM, strengthening and mechanical lumbar traction     Pain Level (0-10 scale) post treatment: 1/10    ASSESSMENT/Changes in Function: Patient tolerated treatment fairly well. Initiated mechanical lumbar traction today with weights of 60# maximal and 40# minimal. No c/o while on or after traction. Pt did well with exercises - only c/o weakness with core exercises. Followed up with pt about getting orthotics per evaluating PT - gave pt names of 2 places near [place]. Patient will continue to benefit from skilled PT services to address functional mobility deficits, address ROM deficits, and address strength deficits to attain remaining goals. [x]  See Plan of Care  []  See progress note/recertification  []  See Discharge Summary         Progress towards goals / Updated goals:  Short Term Goals: To be accomplished in 5 treatments. Patient will be independent with HEP to improve carryover between treatment sessions. Patient will improve lumbar flexion by 5 deg to improve ability to pick objects off the floor and return to standing with less than 3/10 pain. Patient will be able to stand for 30 min to be able to perform work tasks with less than 3/10 pain. Long Term Goals: To be accomplished in 10  treatments. Patient will be able to stand for 2 hours without pain for work tasks. Patient will be able to sit for 2 hours to be able to travel for appointments and to and from work. Patient will be able to sleep through the whole night without waking from pain. Patient will be able to pick boxes and objects from the floor at work without pain.      PLAN  [x] Upgrade activities as tolerated     [x]  Continue plan of care  []  Update interventions per flow sheet       []  Discharge due to:_  []  Other:_      Brittni Randhawa PTA, RG 12/8/2022

## 2022-12-13 ENCOUNTER — HOSPITAL ENCOUNTER (OUTPATIENT)
Dept: PHYSICAL THERAPY | Age: 52
Discharge: HOME | End: 2022-12-13
Payer: COMMERCIAL

## 2022-12-13 PROCEDURE — 97014 ELECTRIC STIMULATION THERAPY: CPT

## 2022-12-13 PROCEDURE — 97012 MECHANICAL TRACTION THERAPY: CPT

## 2022-12-13 PROCEDURE — 97110 THERAPEUTIC EXERCISES: CPT

## 2022-12-13 NOTE — PROGRESS NOTES
PT DAILY TREATMENT NOTE 2-15    Patient Name: Stefani Treviño  Date:2022  : 1970  [x]  Patient  Verified  Payor: Irineo Fofana / Plan: [employer] / Product Type: HMO /    In time:7:03  Out time:7:58  Total Treatment Time (min): 55  Visit #:  3    Treatment Area: DDD (degenerative disc disease), lumbar [M51.36]  Lumbar radiculopathy [M54.16]    SUBJECTIVE  Pain Level (0-10 scale): /10  Any medication changes, allergies to medications, adverse drug reactions, diagnosis change, or new procedure performed?: [x] No    [] Yes (see summary sheet for update)  Subjective functional status/changes:     \"I am a little sore, but can tell exercises are helping. \"    OBJECTIVE      Modality rationale: decrease inflammation, decrease pain, and increase tissue extensibility to improve the patients ability to perform tasks with less radicular symptoms.     Min Type Additional Details       [] Estim: []Att   []Unatt    []TENS instruct                  []IFC  []Premod   []NMES                    []Other:  []w/US      []w/ heat  []w/ ice  Position:  Location:      15 [x]  Traction: [] Cervical       [x]Lumbar                       [] Prone          [x]Supine                       [x]Intermittent   []Continuous Lbs:60 max 40 min   [] before manual  [] after manual  [x] w/ heat  [x] Simultaneously performed with w/ Estim    []  Ultrasound: []Continuous   [] Pulsed                       at: []1MHz   []3MHz Location:  W/cm2:    [] Paraffin         Location:   []w/heat    []  Ice     []  Heat  []  Ice massage Position:  Location:    []  Laser  []  Other: Position:  Location:      []  Vasopneumatic Device Pressure:       [] lo [] med [] hi   [] w/ ice      Temperature:   [] Simultaneously performed with w/ Estim     [x] Skin assessment post-treatment:  [x]intact [x]redness- no adverse reaction    []redness - adverse reaction:       40 min Therapeutic Exercise:  [x] See flow sheet :   Rationale: increase ROM, increase strength, and improve coordination to improve the patients ability to perform work tasks with less lumbar spine irritation. With   [] TE   [] TA   [] neuro   [] other: Patient Education: [x] Review HEP    [] Progressed/Changed HEP based on:   [] positioning   [] body mechanics   [] transfers   [] heat/ice application    [] other:         Pain Level (0-10 scale) post treatment: 0/10    ASSESSMENT/Changes in Function:   Patient tolerated treatment very well. He finds the exercises challenging, but very helpful in his overall core strength. He is having less pain now and finds the heat and estim very relaxing. We kept the traction parameters the same today due to patient reporting that it was a good pull last session. He is now attending the gym and building himself back to cardio and lifting slowly. We will continue to progress as he tolerates. Patient will continue to benefit from skilled PT services to modify and progress therapeutic interventions, address functional mobility deficits, address ROM deficits, address strength deficits, analyze and address soft tissue restrictions, and analyze and cue movement patterns to attain remaining goals. [x]  See Plan of Care  []  See progress note/recertification  []  See Discharge Summary         Progress towards goals / Updated goals:  Short Term Goals: To be accomplished in 5 treatments. Patient will be independent with HEP to improve carryover between treatment sessions. Patient will improve lumbar flexion by 5 deg to improve ability to pick objects off the floor and return to standing with less than 3/10 pain. Patient will be able to stand for 30 min to be able to perform work tasks with less than 3/10 pain. Long Term Goals: To be accomplished in 10  treatments. Patient will be able to stand for 2 hours without pain for work tasks. Patient will be able to sit for 2 hours to be able to travel for appointments and to and from work.    Patient will be able to sleep through the whole night without waking from pain. Patient will be able to pick boxes and objects from the floor at work without pain.      PLAN  [x]  Upgrade activities as tolerated     [x]  Continue plan of care  []  Update interventions per flow sheet       []  Discharge due to:_  []  Other:_      Rakan rFancis, PT, DPT 12/13/2022

## 2022-12-15 ENCOUNTER — HOSPITAL ENCOUNTER (OUTPATIENT)
Dept: PHYSICAL THERAPY | Age: 52
Discharge: HOME | End: 2022-12-15
Payer: COMMERCIAL

## 2022-12-15 PROCEDURE — 97014 ELECTRIC STIMULATION THERAPY: CPT

## 2022-12-15 PROCEDURE — 97110 THERAPEUTIC EXERCISES: CPT

## 2022-12-15 PROCEDURE — 97012 MECHANICAL TRACTION THERAPY: CPT

## 2022-12-15 NOTE — PROGRESS NOTES
PT DAILY TREATMENT NOTE 2-15    Patient Name: Christ Sorto  Date:12/15/2022  : 1970  [x]  Patient  Verified  Payor: Shaniqua Duong / Plan: Susan Lopez / Product Type: HMO /    In time:7:10  Out time:8:03  Total Treatment Time (min): 48  Visit #:  4    Treatment Area: DDD (degenerative disc disease), lumbar [M51.36]  Lumbar radiculopathy [M54.16]    SUBJECTIVE  Pain Level (0-10 scale): 1/10  Any medication changes, allergies to medications, adverse drug reactions, diagnosis change, or new procedure performed?: [x] No    [] Yes (see summary sheet for update)  Subjective functional status/changes: \"The numbness in my feet are getting a little better. \"    OBJECTIVE    Modality rationale: decrease edema, decrease inflammation, decrease pain, and increase tissue extensibility to improve the patients ability to stand for extended periods with less radicular symptoms.    Min Type Additional Details       [] Estim: []Att   []Unatt    []TENS instruct                  []IFC  []Premod   []NMES                    []Other:  []w/US      []w/ heat  []w/ ice  Position:  Location:      15 [x]  Traction: [] Cervical       [x]Lumbar                       [] Prone          [x]Supine                       [x]Intermittent   []Continuous Lbs:65/45  [] before manual  [] after manual  [x] w/ heat  [x] Simultaneously performed with w/ Estim    []  Ultrasound: []Continuous   [] Pulsed                       at: []1MHz   []3MHz Location:  W/cm2:    [] Paraffin         Location:   []w/heat    []  Ice     []  Heat  []  Ice massage Position:  Location:    []  Laser  []  Other: Position:  Location:      []  Vasopneumatic Device Pressure:       [] lo [] med [] hi   [] w/ ice      Temperature:   [] Simultaneously performed with w/ Estim     [x] Skin assessment post-treatment:  [x]intact []redness- no adverse reaction    []redness - adverse reaction:       38 min Therapeutic Exercise:  [x] See flow sheet :   Rationale: increase ROM, increase strength, and improve coordination to improve the patients ability to perform standing work tasks with less irritation. With   [] TE   [] TA   [] neuro   [] other: Patient Education: [x] Review HEP    [] Progressed/Changed HEP based on:   [] positioning   [] body mechanics   [] transfers   [] heat/ice application    [] other:        Pain Level (0-10 scale) post treatment: 0/10    ASSESSMENT/Changes in Function:   Patient tolerated treatment well today. He is noting some decrease in radicular symptoms in his feet the last couple days. We increased weight on traction today which he tolerated well. Patient will continue to benefit from skilled PT services to modify and progress therapeutic interventions, address functional mobility deficits, address ROM deficits, address strength deficits, analyze and address soft tissue restrictions, and analyze and cue movement patterns to attain remaining goals. [x]  See Plan of Care  []  See progress note/recertification  []  See Discharge Summary         Progress towards goals / Updated goals:  Short Term Goals: To be accomplished in 5 treatments. Patient will be independent with HEP to improve carryover between treatment sessions. Patient will improve lumbar flexion by 5 deg to improve ability to pick objects off the floor and return to standing with less than 3/10 pain. Patient will be able to stand for 30 min to be able to perform work tasks with less than 3/10 pain. Long Term Goals: To be accomplished in 10  treatments. Patient will be able to stand for 2 hours without pain for work tasks. Patient will be able to sit for 2 hours to be able to travel for appointments and to and from work. Patient will be able to sleep through the whole night without waking from pain. Patient will be able to pick boxes and objects from the floor at work without pain.      PLAN  [x]  Upgrade activities as tolerated     [x]  Continue plan of care  [] Update interventions per flow sheet       []  Discharge due to:_  []  Other:_      Nilson Ibarra, PT, DPT 12/15/2022

## 2022-12-19 ENCOUNTER — APPOINTMENT (OUTPATIENT)
Dept: PHYSICAL THERAPY | Age: 52
End: 2022-12-19
Payer: COMMERCIAL

## 2022-12-20 ENCOUNTER — HOSPITAL ENCOUNTER (OUTPATIENT)
Dept: PHYSICAL THERAPY | Age: 52
Discharge: HOME | End: 2022-12-20
Payer: COMMERCIAL

## 2022-12-20 PROCEDURE — 97012 MECHANICAL TRACTION THERAPY: CPT

## 2022-12-20 PROCEDURE — 97110 THERAPEUTIC EXERCISES: CPT

## 2022-12-20 PROCEDURE — 97014 ELECTRIC STIMULATION THERAPY: CPT

## 2022-12-20 NOTE — PROGRESS NOTES
PT DAILY TREATMENT NOTE 2-15    Patient Name: Cristina Rome  Date:2022  : 1970  [x]  Patient  Verified  Payor: Ector Pat / Plan: Mortimer Laity / Product Type: HMO /    In time:705 am  Out time:810 am  Total Treatment Time (min): 65  Visit #:  5    Treatment Area: DDD (degenerative disc disease), lumbar [M51.36]  Lumbar radiculopathy [M54.16]    SUBJECTIVE  Pain Level (0-10 scale): 3/10  Any medication changes, allergies to medications, adverse drug reactions, diagnosis change, or new procedure performed?: [x] No    [] Yes (see summary sheet for update)  Subjective functional status/changes: \"Pt reports his back is getting better. \"    OBJECTIVE      Modality rationale: decrease pain, increase tissue extensibility, and increase muscle contraction/control to improve the patients ability to increase back motion    Min Type Additional Details      18 [x] Estim: []Att   [x]Unatt    []TENS instruct                  [x]IFC  []Premod   []NMES                    []Other:  []w/US      [x]w/ heat  []w/ ice  Position: supine with traction   Location:       18 [x]  Traction: [] Cervical       [x]Lumbar                       [] Prone          [x]Supine                       [x]Intermittent   []Continuous Lbs:  [] before manual  [] after manual  [] w/ heat  [] Simultaneously performed with w/ Estim    []  Ultrasound: []Continuous   [] Pulsed                       at: []1MHz   []3MHz Location:  W/cm2:    [] Paraffin         Location:   []w/heat    []  Ice     []  Heat  []  Ice massage Position:  Location:    []  Laser  []  Other: Position:  Location:      []  Vasopneumatic Device Pressure:       [] lo [] med [] hi   [] w/ ice      Temperature:   [] Simultaneously performed with w/ Estim     [x] Skin assessment post-treatment:  [x]intact []redness- no adverse reaction    []redness - adverse reaction:       47 min Therapeutic Exercise:  [x] See flow sheet :   Rationale: increase ROM, increase strength, and improve coordination to improve the patients ability to increase functional back motion and reduce numbness down legs and feet              With   [] TE   [] TA   [] neuro   [] other: Patient Education: [x] Review HEP    [] Progressed/Changed HEP based on:   [] positioning   [] body mechanics   [] transfers   [] heat/ice application    [] other:        Pain Level (0-10 scale) post treatment: 2/10    ASSESSMENT/Changes in Function:   Patient tolerated treatment working on functional mobility, flexibility and core strength to increased activity levels without pain. Pt did well with some fatigue with dead bug core ex. Increased tension on traction no c/o  Patient will continue to benefit from skilled PT services to modify and progress therapeutic interventions, address functional mobility deficits, address ROM deficits, address strength deficits, and analyze and address soft tissue restrictions to attain remaining goals. [x]  See Plan of Care  []  See progress note/recertification  []  See Discharge Summary         Progress towards goals / Updated goals:  Short Term Goals: To be accomplished in 5 treatments. Patient will be independent with HEP to improve carryover between treatment sessions. Patient will improve lumbar flexion by 5 deg to improve ability to pick objects off the floor and return to standing with less than 3/10 pain. Patient will be able to stand for 30 min to be able to perform work tasks with less than 3/10 pain. Long Term Goals: To be accomplished in 10  treatments. Patient will be able to stand for 2 hours without pain for work tasks. Patient will be able to sit for 2 hours to be able to travel for appointments and to and from work. Patient will be able to sleep through the whole night without waking from pain. Patient will be able to pick boxes and objects from the floor at work without pain.      PLAN  [x]  Upgrade activities as tolerated     [x]  Continue plan of care  []  Update interventions per flow sheet       []  Discharge due to:_  []  Other:_      Seble Crouch PTA, RG 12/20/2022

## 2022-12-22 ENCOUNTER — HOSPITAL ENCOUNTER (OUTPATIENT)
Dept: PHYSICAL THERAPY | Age: 52
Discharge: HOME | End: 2022-12-22
Payer: COMMERCIAL

## 2022-12-22 PROCEDURE — 97012 MECHANICAL TRACTION THERAPY: CPT

## 2022-12-22 PROCEDURE — 97014 ELECTRIC STIMULATION THERAPY: CPT

## 2022-12-22 PROCEDURE — 97110 THERAPEUTIC EXERCISES: CPT

## 2022-12-22 NOTE — PROGRESS NOTES
PT DAILY TREATMENT NOTE 2-15    Patient Name: Jesus Manuel Sesay  Date:2022  : 1970  [x]  Patient  Verified  Payor: Johana Mann / Plan: Serenity Castillo / Product Type: HMO /    In time:703 am  Out time:806 am  Total Treatment Time (min): 63  Visit #:  6    Treatment Area: DDD (degenerative disc disease), lumbar [M51.36]  Lumbar radiculopathy [M54.16]    SUBJECTIVE  Pain Level (0-10 scale): 0/10  Any medication changes, allergies to medications, adverse drug reactions, diagnosis change, or new procedure performed?: [x] No    [] Yes (see summary sheet for update)  Subjective functional status/changes: \"Pt reports no pain in his back just still having issues with numbness in his feet but that is getting better. Carlnee Ming \"    OBJECTIVE      Modality rationale: decrease pain, increase tissue extensibility, and increase muscle contraction/control to improve the patients ability to increase back mobility and reduce LE numbness.    Min Type Additional Details      18 [] Estim: []Att   []Unatt    []TENS instruct                  []IFC  []Premod   []NMES                    []Other:  []w/US      []w/ heat  []w/ ice  Position:  Location:      18 []  Traction: [] Cervical       []Lumbar                       [] Prone          []Supine                       []Intermittent   []Continuous Lbs:  [] before manual  [] after manual  [] w/ heat  [] Simultaneously performed with w/ Estim    []  Ultrasound: []Continuous   [] Pulsed                       at: []1MHz   []3MHz Location:  W/cm2:    [] Paraffin         Location:   []w/heat    []  Ice     []  Heat  []  Ice massage Position:  Location:    []  Laser  []  Other: Position:  Location:      []  Vasopneumatic Device Pressure:       [] lo [] med [] hi   [] w/ ice      Temperature:   [] Simultaneously performed with w/ Estim     [x] Skin assessment post-treatment:  [x]intact []redness- no adverse reaction    []redness - adverse reaction:       45 min Therapeutic Exercise: [] See flow sheet :   Rationale: increase ROM, increase strength, and improve coordination to improve the patients ability to increase back activity level and improve LE sensation              With   [] TE   [] TA   [] neuro   [] other: Patient Education: [x] Review HEP    [] Progressed/Changed HEP based on:   [] positioning   [] body mechanics   [] transfers   [] heat/ice application    [] other:        Pain Level (0-10 scale) post treatment: 0/10    ASSESSMENT/Changes in Function:   Patient tolerated treatment working on increasing his flexibility and core strength with back supported. PT tolerates sustained ex even with increased resistance and increased time. LB traction with estim and MHP increased to 75/45 with no issues. Patient will continue to benefit from skilled PT services to modify and progress therapeutic interventions, address functional mobility deficits, address ROM deficits, address strength deficits, and analyze and address soft tissue restrictions to attain remaining goals. [x]  See Plan of Care  []  See progress note/recertification  []  See Discharge Summary         Progress towards goals / Updated goals:  Short Term Goals: To be accomplished in 5 treatments. Patient will be independent with HEP to improve carryover between treatment sessions. Patient will improve lumbar flexion by 5 deg to improve ability to pick objects off the floor and return to standing with less than 3/10 pain. Patient will be able to stand for 30 min to be able to perform work tasks with less than 3/10 pain. Long Term Goals: To be accomplished in 10  treatments. Patient will be able to stand for 2 hours without pain for work tasks. Patient will be able to sit for 2 hours to be able to travel for appointments and to and from work. Patient will be able to sleep through the whole night without waking from pain. Patient will be able to pick boxes and objects from the floor at work without pain. PLAN  [x]  Upgrade activities as tolerated     [x]  Continue plan of care  []  Update interventions per flow sheet       []  Discharge due to:_  []  Other:_      Bryan Zimmerman PTA, LPTA 12/22/2022

## 2022-12-27 ENCOUNTER — HOSPITAL ENCOUNTER (OUTPATIENT)
Dept: PHYSICAL THERAPY | Age: 52
Discharge: HOME | End: 2022-12-27
Payer: COMMERCIAL

## 2022-12-27 PROCEDURE — 97110 THERAPEUTIC EXERCISES: CPT

## 2022-12-27 PROCEDURE — 97012 MECHANICAL TRACTION THERAPY: CPT

## 2022-12-27 PROCEDURE — 97014 ELECTRIC STIMULATION THERAPY: CPT

## 2022-12-27 NOTE — PROGRESS NOTES
PT DAILY TREATMENT NOTE 2-15    Patient Name: Reilly Inman  Date:2022  : 1970  [x]  Patient  Verified  Payor: Masha Galaviz / Plan: Kate Jameson / Product Type: HMO /    In time: 7:05 AM  Out time:8:05 AM  Total Treatment Time (min): 60  Visit #:  7    Treatment Area: DDD (degenerative disc disease), lumbar [M51.36]  Lumbar radiculopathy [M54.16]    SUBJECTIVE  Pain Level (0-10 scale): 0/10  Any medication changes, allergies to medications, adverse drug reactions, diagnosis change, or new procedure performed?: [x] No    [] Yes (see summary sheet for update)    Subjective functional status/changes:     \"I don't have pain, just the numbness in both of my feet. \"  The Lumbar traction has helped and hopefully by the end of the 2 weeks the numbness will be gone. OBJECTIVE    Modality rationale: decrease pain and increase tissue extensibility to improve the patients ability to  be able to stand for 30 min to be able to perform work tasks with less than 3/10 pain.     Min Type Additional Details      20 [] Estim: []Att   []Unatt    []TENS instruct                  []IFC  []Premod   []NMES                    []Other:  []w/US      []w/ heat  []w/ ice  Position:  Location:      18 [x]  Traction: [] Cervical       [x]Lumbar                       [] Prone          [x]Supine                       [x]Intermittent   []Continuous Lbs: 80 lbs max and  45 min  [] before manual  [] after manual  [x] w/ heat  [x] Simultaneously performed with w/ Estim    []  Ultrasound: []Continuous   [] Pulsed                       at: []1MHz   []3MHz Location:  W/cm2:    [] Paraffin         Location:   []w/heat    []  Ice     []  Heat  []  Ice massage Position:  Location:    []  Laser  []  Other: Position:  Location:      []  Vasopneumatic Device Pressure:       [] lo [] med [] hi   [] w/ ice      Temperature:   [] Simultaneously performed with w/ Estim     [x] Skin assessment post-treatment:  [x]intact []redness- no adverse reaction    []redness - adverse reaction:       42 min Therapeutic Exercise:  [x] See flow sheet :   Rationale: increase ROM and increase strength to improve the patients ability to improve lumbar flexion by 5 deg to   improve ability to pick objects off the floor and return to standing with less than 3/10 pain. With   [] TE   [] TA   [] neuro   [] other: Patient Education: [x] Review HEP    [] Progressed/Changed HEP based on:   [] positioning   [] body mechanics   [] transfers   [] heat/ice application    [] other:           Pain Level (0-10 scale) post treatment: 1/10    ASSESSMENT/Changes in Function:   Patient tolerated treatment continuing with lumbar traction to decrease radiculopathy of both feet. Patient is able to perform lumbar stabilization with little difficulty. Progressing to core strengthening exercises. Patient will continue to benefit from skilled PT services to address functional mobility deficits, address ROM deficits, address strength deficits, analyze and address soft tissue restrictions, and analyze and cue movement patterns to attain remaining goals. [x]  See Plan of Care  []  See progress note/recertification  []  See Discharge Summary           Progress towards goals / Updated goals:  Short Term Goals: To be accomplished in 5 treatments. Patient will be independent with HEP to improve carryover between treatment sessions. Patient will improve lumbar flexion by 5 deg to improve ability to pick objects off the floor and return to standing with less than 3/10 pain. Patient will be able to stand for 30 min to be able to perform work tasks with less than 3/10 pain. Long Term Goals: To be accomplished in 10  treatments. Patient will be able to stand for 2 hours without pain for work tasks. Patient will be able to sit for 2 hours to be able to travel for appointments and to and from work. Patient will be able to sleep through the whole night without waking from pain. Patient will be able to pick boxes and objects from the floor at work without pain.      PLAN  [x]  Upgrade activities as tolerated     [x]  Continue plan of care  []  Update interventions per flow sheet       []  Discharge due to:_  []  Other:_      Mohit Martinez, PT,  12/27/2022

## 2022-12-29 ENCOUNTER — HOSPITAL ENCOUNTER (OUTPATIENT)
Dept: PHYSICAL THERAPY | Age: 52
End: 2022-12-29
Payer: COMMERCIAL

## 2022-12-29 PROCEDURE — 97012 MECHANICAL TRACTION THERAPY: CPT

## 2022-12-29 PROCEDURE — 97110 THERAPEUTIC EXERCISES: CPT

## 2022-12-29 PROCEDURE — 97014 ELECTRIC STIMULATION THERAPY: CPT

## 2022-12-29 NOTE — PROGRESS NOTES
PT DAILY TREATMENT NOTE 2-15    Patient Name: Sina Lo  Date:2022  : 1970  [x]  Patient  Verified  Payor: Tesha Breen / Plan: Jackie Estevez / Product Type: HMO /    In time: 7:05 AM  Out time:8:05 AM  Total Treatment Time (min): 60  Visit #:  8    Treatment Area: DDD (degenerative disc disease), lumbar [M51.36]  Lumbar radiculopathy [M54.16]    SUBJECTIVE  Pain Level (0-10 scale): 1/10  Any medication changes, allergies to medications, adverse drug reactions, diagnosis change, or new procedure performed?: [x] No    [] Yes (see summary sheet for update)  Subjective functional status/changes:     \"I return to the [facility] on 2023. The numbness is still in my feet, but very light at this time. \"    OBJECTIVE    Modality rationale: decrease inflammation, decrease pain, and increase tissue extensibility to improve the patients ability to stand for 30 min to be able to perform work tasks with less than 3/10 pain.     Min Type Additional Details      20 [x] Estim: []Att   []Unatt    []TENS instruct                  [x]IFC  []Premod   []NMES                    []Other:  []w/US      [x]w/ heat  []w/ ice  Position:supine  Location: lumbar      16 [x]  Traction: [] Cervical       [x]Lumbar                       [] Prone          [x]Supine                       [x]Intermittent   []Continuous Lbs: 90 lbs max and 50 lbs minimal  [] before manual  [] after manual  [] w/ heat  [] Simultaneously performed with w/ Estim    []  Ultrasound: []Continuous   [] Pulsed                       at: []1MHz   []3MHz Location:  W/cm2:    [] Paraffin         Location:   []w/heat    []  Ice     []  Heat  []  Ice massage Position:  Location:    []  Laser  []  Other: Position:  Location:      []  Vasopneumatic Device Pressure:       [] lo [] med [] hi   [] w/ ice      Temperature:   [] Simultaneously performed with w/ Estim     [x] Skin assessment post-treatment:  [x]intact []redness- no adverse reaction    []redness - adverse reaction:       44 min Therapeutic Exercise:  [x] See flow sheet :   Rationale: increase ROM and increase strength to improve the patients ability to   stand for 30 min to be able to perform work tasks with less than 3/10 pain. With   [] TE   [] TA   [] neuro   [] other: Patient Education: [x] Review HEP    [] Progressed/Changed HEP based on:   [] positioning   [] body mechanics   [] transfers   [] heat/ice application    [] other:      Pain Level (0-10 scale) post treatment: 1/10    ASSESSMENT/Changes in Function:   Patient tolerated treatment for decreasing numbness of both feet with lumbar traction. Gradually increasing lumbar traction to decrease impingement. Continue with lumbar stretching and range of motion exercises. Patient does well with exercise program and can perform them without difficulty. Patient will continue to benefit from skilled PT services to address functional mobility deficits, address ROM deficits, address strength deficits, analyze and address soft tissue restrictions, and analyze and cue movement patterns to attain remaining goals. [x]  See Plan of Care  []  See progress note/recertification  []  See Discharge Summary        Progress towards goals / Updated goals:  Short Term Goals: To be accomplished in 5 treatments. Patient will be independent with HEP to improve carryover between treatment sessions. Patient will improve lumbar flexion by 5 deg to improve ability to pick objects off the floor and return to standing with less than 3/10 pain. Patient will be able to stand for 30 min to be able to perform work tasks with less than 3/10 pain. Long Term Goals: To be accomplished in 10  treatments. Patient will be able to stand for 2 hours without pain for work tasks. Patient will be able to sit for 2 hours to be able to travel for appointments and to and from work. Patient will be able to sleep through the whole night without waking from pain.    Patient will be able to pick boxes and objects from the floor at work without pain.     PLAN  [x]  Upgrade activities as tolerated     [x]  Continue plan of care  []  Update interventions per flow sheet       []  Discharge due to:_  []  Other:_      Sotero Boxer, PT,  12/29/2022

## 2023-01-03 ENCOUNTER — APPOINTMENT (OUTPATIENT)
Dept: PHYSICAL THERAPY | Age: 53
End: 2023-01-03
Payer: COMMERCIAL

## 2023-01-05 ENCOUNTER — HOSPITAL ENCOUNTER (OUTPATIENT)
Dept: PHYSICAL THERAPY | Age: 53
End: 2023-01-05
Payer: COMMERCIAL

## 2023-01-05 PROCEDURE — 97014 ELECTRIC STIMULATION THERAPY: CPT

## 2023-01-05 PROCEDURE — 97110 THERAPEUTIC EXERCISES: CPT

## 2023-01-05 PROCEDURE — 97012 MECHANICAL TRACTION THERAPY: CPT

## 2023-01-05 NOTE — PROGRESS NOTES
PT DAILY TREATMENT NOTE 2-15    Patient Name: Krzysztof Flores  Date:2023  : 1970  [x]  Patient  Verified  Payor: Nicolette Galdamez / Plan: Lino December / Product Type: HMO /    In time: 7:06  Out time: 8:01  Total Treatment Time (min): 55  Visit #:  9    Treatment Area: DDD (degenerative disc disease), lumbar [M51.36]  Lumbar radiculopathy [M54.16]    SUBJECTIVE  Pain Level (0-10 scale): 1/10  Any medication changes, allergies to medications, adverse drug reactions, diagnosis change, or new procedure performed?: [x] No    [] Yes (see summary sheet for update)  Subjective functional status/changes:     Pt states he's doing better, but his feet are still numb.     OBJECTIVE    Modality rationale: decrease pain and increase tissue extensibility to improve the patients ability to decrease nerve compression   Min Type Additional Details      20 [x] Estim: []Att   [x]Unatt    []TENS instruct                  [x]IFC  []Premod   []NMES                    []Other:  []w/US      [x]w/ heat  []w/ ice  Position: w/ traction  Location: low back        20 [x]  Traction: [] Cervical       [x]Lumbar                       [] Prone          [x]Supine                       [x]Intermittent   []Continuous Lbs: 93# maximal 50# minimal  [] before manual  [] after manual  [x] w/ heat  [x] Simultaneously performed with w/ Estim    []  Ultrasound: []Continuous   [] Pulsed                       at: []1MHz   []3MHz Location:  W/cm2:    [] Paraffin         Location:   []w/heat    []  Ice     []  Heat  []  Ice massage Position:  Location:    []  Laser  []  Other: Position:  Location:      []  Vasopneumatic Device Pressure:       [] lo [] med [] hi   [] w/ ice      Temperature:   [] Simultaneously performed with w/ Estim     [x] Skin assessment post-treatment:  [x]intact [x]redness- no adverse reaction    []redness - adverse reaction:       35 min Therapeutic Exercise:  [x] See flow sheet :   Rationale: increase ROM and increase strength to improve the patients ability to improve functional mobility          With   [x] TE   [] TA   [] neuro   [] other: Patient Education: [x] Review HEP    [] Progressed/Changed HEP based on:   [] positioning   [] body mechanics   [] transfers   [] heat/ice application    [] other:      Pain Level (0-10 scale) post treatment: 1/10 - numbness in feet    ASSESSMENT/Changes in Function: Patient tolerated treatment fairly well. Increased maximal traction weight to 93#. No c/o while on or after traction. Patient will continue to benefit from skilled PT services to address functional mobility deficits, address ROM deficits, and address strength deficits to attain remaining goals. [x]  See Plan of Care  []  See progress note/recertification  []  See Discharge Summary         Progress towards goals / Updated goals:  Short Term Goals: To be accomplished in 5 treatments. Patient will be independent with HEP to improve carryover between treatment sessions. Patient will improve lumbar flexion by 5 deg to improve ability to pick objects off the floor and return to standing with less than 3/10 pain. Patient will be able to stand for 30 min to be able to perform work tasks with less than 3/10 pain. Long Term Goals: To be accomplished in 10  treatments. Patient will be able to stand for 2 hours without pain for work tasks. Patient will be able to sit for 2 hours to be able to travel for appointments and to and from work. Patient will be able to sleep through the whole night without waking from pain. Patient will be able to pick boxes and objects from the floor at work without pain.     PLAN  [x]  Upgrade activities as tolerated     [x]  Continue plan of care  []  Update interventions per flow sheet       []  Discharge due to:_  []  Other:_      Stephanie Nichols, SHASTA, LPTA 1/5/2023

## 2023-01-10 ENCOUNTER — APPOINTMENT (OUTPATIENT)
Dept: PHYSICAL THERAPY | Age: 53
End: 2023-01-10
Payer: COMMERCIAL

## 2023-01-10 PROCEDURE — 97110 THERAPEUTIC EXERCISES: CPT

## 2023-01-10 PROCEDURE — 97012 MECHANICAL TRACTION THERAPY: CPT

## 2023-01-10 PROCEDURE — 97530 THERAPEUTIC ACTIVITIES: CPT

## 2023-01-10 PROCEDURE — 97014 ELECTRIC STIMULATION THERAPY: CPT

## 2023-01-10 NOTE — PROGRESS NOTES
PT DAILY TREATMENT NOTE 2-15    Patient Name: Hu Laurent  Date:1/10/2023  : 1970  [x]  Patient  Verified  Payor: Josefa Hastings / Plan: Etelvina Esposito / Product Type: HMO /    In time:710 am  Out time:809 am  Total Treatment Time (min): 61  Visit #:  10    Treatment Area: DDD (degenerative disc disease), lumbar [M51.36]  Lumbar radiculopathy [M54.16]    SUBJECTIVE  Pain Level (0-10 scale): 1/10  Any medication changes, allergies to medications, adverse drug reactions, diagnosis change, or new procedure performed?: [x] No    [] Yes (see summary sheet for update)  Subjective functional status/changes: \"Pt reports doing better . \"    OBJECTIVE      Modality rationale: decrease pain, increase tissue extensibility, and increase muscle contraction/control to improve the patients ability to increase back motion and reduce radicular pain   Min Type Additional Details      10 [x] Estim: []Att   [x]Unatt    []TENS instruct                  [x]IFC  []Premod   []NMES                    []Other:  []w/US      [x]w/ heat  []w/ ice  Position: supine with traction   Location: low back       11 [x]  Traction: [] Cervical       [x]Lumbar                       [] Prone          [x]Supine                       [x]Intermittent   []Continuous Lbs:95/45  [] before manual  [] after manual  [x] w/ heat  [x] Simultaneously performed with w/ Estim    []  Ultrasound: []Continuous   [] Pulsed                       at: []1MHz   []3MHz Location:  W/cm2:    [] Paraffin         Location:   []w/heat    []  Ice     []  Heat  []  Ice massage Position:  Location:    []  Laser  []  Other: Position:  Location:      []  Vasopneumatic Device Pressure:       [] lo [] med [] hi   [] w/ ice      Temperature:   [] Simultaneously performed with w/ Estim     [x] Skin assessment post-treatment:  [x]intact []redness- no adverse reaction    []redness - adverse reaction:       14 min Therapeutic Exercise:  [x] See flow sheet :   Rationale: increase ROM, increase strength, improve coordination, and improve balance to improve the patients ability to increase functional activities for sports and work with little to no c/o    34 min Therapeutic Activity:  [x]  See flow sheet :   Rationale: increase ROM, increase strength, and improve coordination  to improve the patients ability to increase activity levels and meet goals, MMT, and ROM levels in POC.                With   [] TE   [] TA   [] neuro   [] other: Patient Education: [x] Review HEP    [] Progressed/Changed HEP based on:   [] positioning   [] body mechanics   [] transfers   [] heat/ice application    [] other:      Other Objective/Functional Measures: OBJECTIVE  Posture:  normal  Gait and Functional Mobility:  normal gait pattern  Palpation: tenderness in bilateral paraspinal muscles L side to mid back                                                    Lumbar AROM:                                               Flexion                                              80 degrees                                               Extension                                         40 degrees                                                                                                      L                                 R  Side Bending                           35 degrees                  35 degrees                    Rotation                                   60 degrees                  60 degrees                          Manual Muscle Testing           L                    R  Hip Flexion                              4/5                   5/5  Knee Extension                       5/5                  5/5  Knee Flexion                           5/5                   5/5  Ankle PF                                 5/5                   5/5  Ankle DF                                 5/5                   5/5                                     Pain Level (0-10 scale) post treatment: 0/10    ASSESSMENT/Changes in Function:   Patient tolerated treatment for initial stretch and traction today with remaining session being review of ROM,MMT, and goals to summarize progress. DPT talked with pt and will write up progress note for  As well as discussed continuation for now with more focus on core strength and additional stretching. Pt will be given updated HEP and will have additional goals to work on per DPT. Patient will continue to benefit from skilled PT services to modify and progress therapeutic interventions, address functional mobility deficits, address ROM deficits, address strength deficits, and analyze and cue movement patterns to attain remaining goals. [x]  See Plan of Care  []  See progress note/recertification  []  See Discharge Summary         Progress towards goals / Updated goals:  Short Term Goals: To be accomplished in 5 treatments. Patient will be independent with HEP to improve carryover between treatment sessions. Goal met  Patient will improve lumbar flexion by 5 deg to improve ability to pick objects off the floor and return to standing with less than 3/10 pain. Goal Met  Patient will be able to stand for 30 min to be able to perform work tasks with less than 3/10 pain. Goal met     Long Term Goals: To be accomplished in 10  treatments. Patient will be able to stand for 2 hours without pain for work tasks. Progressing    Patient will be able to sit for 2 hours to be able to travel for appointments and to and from work. Goal met  Patient will be able to sleep through the whole night without waking from pain. Goal met  Patient will be able to pick boxes and objects from the floor at work without pain.  Goal met     PLAN  [x]  Upgrade activities as tolerated     [x]  Continue plan of care  [x]  Update interventions per flow sheet       []  Discharge due to:_  []  Other:_      Bjorn Rivers PTA, LPCHARLES 1/10/2023

## 2023-01-10 NOTE — PROGRESS NOTES
[address]  [place], [place]  Ph: [phone]    Fax: [phone]    Progress Note    Name: Nati Guido   : 1970   MD: Yazmin Mead, DO       Treatment Diagnosis: DDD (degenerative disc disease), lumbar [M51.36]  Lumbar radiculopathy [M54.16]  Start of Care: 2022    Visits from Start of Care: 10   Missed Visits: 1    Summary of Care / Assessment / Recommendations:   Patient has attended 10 physical therapy visits with good progress being made. He has improved in pain free ROM, but still will get occasional catch in upper lumbar spine with certain movements. He does have a little swelling just distal to the area that is catching in his lumbar spine which we are monitoring for any changes. He still has some weakness in L hip flexor strength and core strength. We will lend future sessions to improving these areas and will updated HEP to continue working on these deficits. He is have less sharp pain in his feet, but still notices some numbness not as severe intermittently. He will return to MD next week to discussed this continued finding. We will continue with skilled PT interventions for one more round to further address remaining deficits. Progress towards goals / Updated goals:  Short Term Goals: To be accomplished in 5 treatments. Patient will be independent with HEP to improve carryover between treatment sessions. Goal met  Patient will improve lumbar flexion by 5 deg to improve ability to pick objects off the floor and return to standing with less than 3/10 pain. Goal Met  Patient will be able to stand for 30 min to be able to perform work tasks with less than 3/10 pain. Goal met     Long Term Goals: To be accomplished in 10  treatments. Patient will be able to stand for 2 hours without pain for work tasks.  Progressing  (has not focused on this at work yet)  Patient will be able to sit for 2 hours to be able to travel for appointments and to and from work. Goal met  Patient will be able to sleep through the whole night without waking from pain. Goal met  Patient will be able to pick boxes and objects from the floor at work without pain. Goal met  Patient will have full LE strength to improve ability to return to the gym and other recreational tasks without pain. New goal 01/10/2023    Frequency/Duration:  2 treatments per week, for 4 weeks. Theta Hunting, PT, DPT 1/10/2023         ________________________________________________________________________  NOTE TO PHYSICIAN:  Please complete the following and fax to:  [facility]:   Fax: [phone]  . Retain this original for your records. If you are unable to process this request in 24 hours, please contact our office.        ____ I have read the above report and request that my patient continue therapy with the following changes/special instructions:  ____ I have read the above report and request that my patient be discharged from therapy    Physician's Signature:_________________ Date:___________Time:__________

## 2023-01-12 ENCOUNTER — APPOINTMENT (OUTPATIENT)
Dept: PHYSICAL THERAPY | Age: 53
End: 2023-01-12
Payer: COMMERCIAL

## 2023-01-12 PROCEDURE — 97014 ELECTRIC STIMULATION THERAPY: CPT

## 2023-01-12 PROCEDURE — 97012 MECHANICAL TRACTION THERAPY: CPT

## 2023-01-12 PROCEDURE — 97110 THERAPEUTIC EXERCISES: CPT

## 2023-01-12 NOTE — PROGRESS NOTES
PT DAILY TREATMENT NOTE 2-15    Patient Name: Klaudia Kendrick  Date:2023  : 1970  [x]  Patient  Verified  Payor: Erika Melgar / Plan: Halle Pineda / Product Type: HMO /    In time:712 am  Out time:810 am  Total Treatment Time (min): 62  Visit #:  11    Treatment Area: DDD (degenerative disc disease), lumbar [M51.36]  Lumbar radiculopathy [M54.16]    SUBJECTIVE  Pain Level (0-10 scale): 1/10  Any medication changes, allergies to medications, adverse drug reactions, diagnosis change, or new procedure performed?: [x] No    [] Yes (see summary sheet for update)  Subjective functional status/changes:      \"Pt reports still having the numbness in his feet at it is a 1 this am.\"    OBJECTIVE      Modality rationale: decrease pain, increase tissue extensibility, and increase muscle contraction/control to improve the patients ability to increase back to normal functional with no foot numbness   Min Type Additional Details      11 [x] Estim: []Att   [x]Unatt    []TENS instruct                  [x]IFC  []Premod   []NMES                    []Other:  []w/US      [x]w/ heat  []w/ ice  Position: supine   Location: low back       11 [x]  Traction: [] Cervical       [x]Lumbar                       [] Prone          [x]Supine                       [x]Intermittent   []Continuous Lbs:95/45  [] before manual  [] after manual  [x] w/ heat  [x] Simultaneously performed with w/ Estim    []  Ultrasound: []Continuous   [] Pulsed                       at: []1MHz   []3MHz Location:  W/cm2:    [] Paraffin         Location:   []w/heat    []  Ice     []  Heat  []  Ice massage Position:  Location:    []  Laser  []  Other: Position:  Location:      []  Vasopneumatic Device Pressure:       [] lo [] med [] hi   [] w/ ice      Temperature:   [] Simultaneously performed with w/ Estim     [x] Skin assessment post-treatment:  [x]intact []redness- no adverse reaction    []redness - adverse reaction:       47 min Therapeutic Exercise: [x] See flow sheet :   Rationale: increase ROM, increase strength, and improve coordination to improve the patients ability to increase activity level to sports and work with no weakness or LE numbness issues              With   [] TE   [] TA   [] neuro   [] other: Patient Education: [x] Review HEP    [x] Progressed/Changed HEP based on: updated for core strength and increased back activity levels  [] positioning   [] body mechanics   [] transfers   [] heat/ice application    [] other:         Pain Level (0-10 scale) post treatment: .5/10    ASSESSMENT/Changes in Function:   Patient tolerated treatment worked on sustained ex prior to traction and estim. Pt given updated HEP reviewed and demonstrated pt to start with first four ex and only add 1 of new as he is able and low back has no pain with introduction of resistance. Pt did well and did have some improvement in c/o post ex. Patient will continue to benefit from skilled PT services to modify and progress therapeutic interventions, address functional mobility deficits, address ROM deficits, address strength deficits, and address imbalance/dizziness to attain remaining goals. [x]  See Plan of Care  []  See progress note/recertification  []  See Discharge Summary         Progress towards goals / Updated goals:  Short Term Goals: To be accomplished in 5 treatments. Patient will be independent with HEP to improve carryover between treatment sessions. Goal met  Patient will improve lumbar flexion by 5 deg to improve ability to pick objects off the floor and return to standing with less than 3/10 pain. Goal Met  Patient will be able to stand for 30 min to be able to perform work tasks with less than 3/10 pain. Goal met     Long Term Goals: To be accomplished in 10  treatments. Patient will be able to stand for 2 hours without pain for work tasks.  Progressing  (has not focused on this at work yet)  Patient will be able to sit for 2 hours to be able to travel for appointments and to and from work. Goal met  Patient will be able to sleep through the whole night without waking from pain. Goal met  Patient will be able to pick boxes and objects from the floor at work without pain. Goal met  Patient will have full LE strength to improve ability to return to the gym and other recreational tasks without pain.  New goal 01/10/2023       PLAN  [x]  Upgrade activities as tolerated     [x]  Continue plan of care  []  Update interventions per flow sheet       []  Discharge due to:_  []  Other:_      Rita Jefferson PTA, RG 1/12/2023

## 2023-01-17 ENCOUNTER — APPOINTMENT (OUTPATIENT)
Dept: PHYSICAL THERAPY | Age: 53
End: 2023-01-17
Payer: COMMERCIAL

## 2023-01-20 ENCOUNTER — HOSPITAL ENCOUNTER (OUTPATIENT)
Dept: PHYSICAL THERAPY | Age: 53
Discharge: HOME | End: 2023-01-20
Payer: COMMERCIAL

## 2023-01-20 PROCEDURE — 97012 MECHANICAL TRACTION THERAPY: CPT

## 2023-01-20 PROCEDURE — 97110 THERAPEUTIC EXERCISES: CPT

## 2023-01-20 PROCEDURE — 97014 ELECTRIC STIMULATION THERAPY: CPT

## 2023-01-20 NOTE — PROGRESS NOTES
PT DAILY TREATMENT NOTE 2-15    Patient Name: Dayo Brown  Date:2023  : 1970  [x]  Patient  Verified  Payor: Brian Scott / Plan: Tea Oconnell / Product Type: HMO /    In time:8:12 AM  Out time:9:12 AM  Total Treatment Time (min): 60  Visit #:  12    Treatment Area: DDD (degenerative disc disease), lumbar [M51.36]  Lumbar radiculopathy [M54.16]    SUBJECTIVE  Pain Level (0-10 scale): 1/10  Any medication changes, allergies to medications, adverse drug reactions, diagnosis change, or new procedure performed?: [x] No    [] Yes (see summary sheet for update)  Subjective functional status/changes:     \"I returned to the MD on 2023. He told me to finish my therapy and continue with  exercises at home. \"  My main concern continues to be the numbness in my legs. OBJECTIVE      Modality rationale: decrease pain and increase tissue extensibility to improve the patients ability to decrease nerve root compression in order to return to have full LE strength to improve ability to return to the gym and other recreational tasks without pain.    Min Type Additional Details      15 [x] Estim: []Att   [x]Unatt    []TENS instruct                  [x]IFC  []Premod   []NMES                    []Other:  []w/US      [x]w/ heat  []w/ ice  Position: supine with traction  Location: low back      15 [x]  Traction: [] Cervical       [x]Lumbar                       [] Prone          [x]Supine                       [x]Intermittent   []Continuous Lbs: 95 lb max and 45 minimal  [] before manual  [] after manual  [x] w/ heat  [x] Simultaneously performed with w/ Estim    []  Ultrasound: []Continuous   [] Pulsed                       at: []1MHz   []3MHz Location:  W/cm2:    [] Paraffin         Location:   []w/heat    []  Ice     []  Heat  []  Ice massage Position:  Location:    []  Laser  []  Other: Position:  Location:      []  Vasopneumatic Device Pressure:       [] lo [] med [] hi   [] w/ ice Temperature:   [] Simultaneously performed with w/ Estim     [x] Skin assessment post-treatment:  [x]intact []redness- no adverse reaction    []redness - adverse reaction:       40 min Therapeutic Exercise:  [x] See flow sheet :   Rationale: increase strength to improve the patients ability t improve ability to return to the gym and other recreational tasks without pain. With   [] TE   [] TA   [] neuro   [] other: Patient Education: [x] Review HEP    [] Progressed/Changed HEP based on:   [] positioning   [] body mechanics   [] transfers   [] heat/ice application    [] other:          Pain Level (0-10 scale) post treatment: 1/10    ASSESSMENT/Changes in Function:   Patient tolerated treatment with lumbar traction to decrease nerve root compression. Patient has been compliant with exercise program. Will include core strengthening exercises for a comprehensive exercise program addressing ROM, flexibility, core stabilization for Home program and completing the plan of care. Patient will continue to benefit from skilled PT services to address strength deficits, analyze and address soft tissue restrictions, analyze and cue movement patterns, and analyze and modify body mechanics/ergonomics to attain remaining goals. [x]  See Plan of Care  []  See progress note/recertification  []  See Discharge Summary         Progress towards goals / Updated goals:  Short Term Goals: To be accomplished in 5 treatments. Patient will be independent with HEP to improve carryover between treatment sessions. Goal met  Patient will improve lumbar flexion by 5 deg to improve ability to pick objects off the floor and return to standing with less than 3/10 pain. Goal Met  Patient will be able to stand for 30 min to be able to perform work tasks with less than 3/10 pain. Goal met     Long Term Goals: To be accomplished in 10  treatments. Patient will be able to stand for 2 hours without pain for work tasks.  Progressing  (has not focused on this at work yet)  Patient will be able to sit for 2 hours to be able to travel for appointments and to and from work. Goal met  Patient will be able to sleep through the whole night without waking from pain. Goal met  Patient will be able to pick boxes and objects from the floor at work without pain. Goal met  Patient will have full LE strength to improve ability to return to the gym and other recreational tasks without pain.  New goal 01/10/2023    PLAN  [x]  Upgrade activities as tolerated     [x]  Continue plan of care  []  Update interventions per flow sheet       []  Discharge due to:_  []  Other:_      Pam Maldonado, PT,  1/20/2023

## 2023-01-24 ENCOUNTER — HOSPITAL ENCOUNTER (OUTPATIENT)
Dept: PHYSICAL THERAPY | Age: 53
Discharge: HOME | End: 2023-01-24
Payer: COMMERCIAL

## 2023-01-24 PROCEDURE — 97014 ELECTRIC STIMULATION THERAPY: CPT

## 2023-01-24 PROCEDURE — 97012 MECHANICAL TRACTION THERAPY: CPT

## 2023-01-24 PROCEDURE — 97110 THERAPEUTIC EXERCISES: CPT

## 2023-01-24 NOTE — PROGRESS NOTES
PT DAILY TREATMENT NOTE 2-15    Patient Name: Vanessa Villasenor  Date:2023  : 1970  [x]  Patient  Verified  Payor: Brandin Gagnon / Plan: Rupa Cruz / Product Type: HMO /    In time:7:10 AM  Out time:8:00 AM  Total Treatment Time (min): 50  Visit #:  13    Treatment Area: DDD (degenerative disc disease), lumbar [M51.36]  Lumbar radiculopathy [M54.16]    SUBJECTIVE  Pain Level (0-10 scale): 1/10  Any medication changes, allergies to medications, adverse drug reactions, diagnosis change, or new procedure performed?: [x] No    [] Yes (see summary sheet for update)    Subjective functional status/changes:     \"I do not have the severe numbness in my legs like I did in the beginning. I have one more visit. I do  Not have to return to MD unless necessary. He wants me to continue with doing my core stabilization exercises and   continue with proper lifting especially at work. Will not return to volleyball until the spring. \"    OBJECTIVE    Modality rationale: decrease pain and increase tissue extensibility to improve the patients ability to perform ADL and work activities.     Min Type Additional Details      15 [x] Estim: []Att   [x]Unatt    []TENS instruct                  [x]IFC  []Premod   []NMES                    []Other:  []w/US      [x]w/ heat  []w/ ice  Position:supine  Location: lumbar      15 [x]  Traction: [] Cervical       [x]Lumbar                       [] Prone          [x]Supine                       [x]Intermittent   []Continuous Lbs: 95 lbs max and 45 minimal  [] before manual  [] after manual  [] w/ heat  [x] Simultaneously performed with w/ Estim    []  Ultrasound: []Continuous   [] Pulsed                       at: []1MHz   []3MHz Location:  W/cm2:    [] Paraffin         Location:   []w/heat    []  Ice     []  Heat  []  Ice massage Position:  Location:    []  Laser  []  Other: Position:  Location:      []  Vasopneumatic Device Pressure:       [] lo [] med [] hi   [] w/ ice Temperature:   [] Simultaneously performed with w/ Estim     [x] Skin assessment post-treatment:  [x]intact []redness- no adverse reaction    []redness - adverse reaction:       35 min Therapeutic Exercise:  [x] See flow sheet :   Rationale: increase strength to improve the patients ability to have full LE strength to improve ability to  return to the gym and other recreational tasks without pain. With   [] TE   [] TA   [] neuro   [] other: Patient Education: [x] Review HEP    [] Progressed/Changed HEP based on:   [] positioning   [] body mechanics   [] transfers   [] heat/ice application    [] other:        Pain Level (0-10 scale) post treatment: 1/10    ASSESSMENT/Changes in Function:   Patient tolerated treatment with core strengthening exercises. Patient is experiencing minimal pain at this time. He is pleased with the decrease of radiculopathy to his legs. Patient will complete plan of care on the next visit and will be discharged on his HEP to continue with strengthening and range of motion of the lumbar. Patient will continue to benefit from skilled PT services to address ROM deficits, address strength deficits, analyze and address soft tissue restrictions, and analyze and cue movement patterns to attain remaining goals. [x]  See Plan of Care  []  See progress note/recertification  []  See Discharge Summary         Progress towards goals / Updated goals:  Short Term Goals: To be accomplished in 5 treatments. Patient will be independent with HEP to improve carryover between treatment sessions. Goal met  Patient will improve lumbar flexion by 5 deg to improve ability to pick objects off the floor and return to standing with less than 3/10 pain. Goal Met  Patient will be able to stand for 30 min to be able to perform work tasks with less than 3/10 pain. Goal met     Long Term Goals: To be accomplished in 10  treatments.   Patient will be able to stand for 2 hours without pain for work tasks. Progressing  (has not focused on this at work yet)  Patient will be able to sit for 2 hours to be able to travel for appointments and to and from work. Goal met  Patient will be able to sleep through the whole night without waking from pain. Goal met  Patient will be able to pick boxes and objects from the floor at work without pain. Goal met  Patient will have full LE strength to improve ability to return to the gym and other recreational tasks without pain.  New goal 01/10/2023    PLAN  [x]  Upgrade activities as tolerated     [x]  Continue plan of care  []  Update interventions per flow sheet       []  Discharge due to:_  []  Other:_      Lary Pearl, PT,  1/24/2023

## 2023-05-19 RX ORDER — OMEPRAZOLE 40 MG/1
40 CAPSULE, DELAYED RELEASE ORAL DAILY
Status: ON HOLD | COMMUNITY
Start: 2021-09-24 | End: 2023-06-26

## 2023-05-19 RX ORDER — MONTELUKAST SODIUM 10 MG/1
10 TABLET ORAL DAILY
COMMUNITY
Start: 2021-09-09

## 2023-05-19 RX ORDER — HYDROCORTISONE ACETATE 25 MG/1
SUPPOSITORY RECTAL
COMMUNITY
Start: 2021-07-20

## 2023-06-26 ENCOUNTER — HOSPITAL ENCOUNTER (INPATIENT)
Facility: HOSPITAL | Age: 53
LOS: 2 days | Discharge: HOME | DRG: 872 | End: 2023-06-28
Attending: EMERGENCY MEDICINE | Admitting: INTERNAL MEDICINE
Payer: COMMERCIAL

## 2023-06-26 ENCOUNTER — APPOINTMENT (OUTPATIENT)
Facility: HOSPITAL | Age: 53
DRG: 872 | End: 2023-06-26
Attending: EMERGENCY MEDICINE
Payer: COMMERCIAL

## 2023-06-26 ENCOUNTER — APPOINTMENT (OUTPATIENT)
Facility: HOSPITAL | Age: 53
DRG: 872 | End: 2023-06-26
Payer: COMMERCIAL

## 2023-06-26 DIAGNOSIS — N41.0 ACUTE PROSTATITIS: Primary | ICD-10-CM

## 2023-06-26 PROBLEM — A41.9 SEVERE SEPSIS (HCC): Status: ACTIVE | Noted: 2023-06-26

## 2023-06-26 PROBLEM — E11.9 NEW ONSET TYPE 2 DIABETES MELLITUS (HCC): Status: ACTIVE | Noted: 2023-06-26

## 2023-06-26 PROBLEM — R65.20 SEVERE SEPSIS (HCC): Status: ACTIVE | Noted: 2023-06-26

## 2023-06-26 PROBLEM — N17.9 AKI (ACUTE KIDNEY INJURY) (HCC): Status: ACTIVE | Noted: 2023-06-26

## 2023-06-26 LAB
ALBUMIN SERPL-MCNC: 3.5 G/DL (ref 3.5–5)
ALBUMIN/GLOB SERPL: 0.8 (ref 1.1–2.2)
ALP SERPL-CCNC: 137 U/L (ref 45–117)
ALT SERPL-CCNC: 51 U/L (ref 12–78)
ANION GAP SERPL CALC-SCNC: 11 MMOL/L (ref 5–15)
APPEARANCE UR: CLEAR
AST SERPL W P-5'-P-CCNC: 26 U/L (ref 15–37)
BACTERIA URNS QL MICRO: NEGATIVE /HPF
BASOPHILS # BLD: 0 K/UL (ref 0–0.1)
BASOPHILS NFR BLD: 0 % (ref 0–1)
BILIRUB SERPL-MCNC: 2.7 MG/DL (ref 0.2–1)
BILIRUB UR QL CFM: POSITIVE
BILIRUB UR QL: POSITIVE
BUN SERPL-MCNC: 20 MG/DL (ref 6–20)
BUN/CREAT SERPL: 13 (ref 12–20)
CA-I BLD-MCNC: 9.3 MG/DL (ref 8.5–10.1)
CHLORIDE SERPL-SCNC: 94 MMOL/L (ref 97–108)
CHP ED QC CHECK: YES
CO2 SERPL-SCNC: 24 MMOL/L (ref 21–32)
COLOR UR: ABNORMAL
CREAT SERPL-MCNC: 1.55 MG/DL (ref 0.7–1.3)
DIFFERENTIAL METHOD BLD: ABNORMAL
EOSINOPHIL # BLD: 0 K/UL (ref 0–0.4)
EOSINOPHIL NFR BLD: 0 % (ref 0–7)
ERYTHROCYTE [DISTWIDTH] IN BLOOD BY AUTOMATED COUNT: 14.5 % (ref 11.5–14.5)
FLUAV RNA SPEC QL NAA+PROBE: NOT DETECTED
FLUBV RNA SPEC QL NAA+PROBE: NOT DETECTED
GLOBULIN SER CALC-MCNC: 4.5 G/DL (ref 2–4)
GLUCOSE BLD STRIP.AUTO-MCNC: 166 MG/DL (ref 65–100)
GLUCOSE BLD STRIP.AUTO-MCNC: 224 MG/DL (ref 65–100)
GLUCOSE BLD-MCNC: 224 MG/DL
GLUCOSE SERPL-MCNC: 236 MG/DL (ref 65–100)
GLUCOSE UR STRIP.AUTO-MCNC: 100 MG/DL
HCT VFR BLD AUTO: 41.7 % (ref 36.6–50.3)
HGB BLD-MCNC: 14.4 G/DL (ref 12.1–17)
HGB UR QL STRIP: ABNORMAL
IMM GRANULOCYTES # BLD AUTO: 0.2 K/UL (ref 0–0.04)
IMM GRANULOCYTES NFR BLD AUTO: 1 % (ref 0–0.5)
INR PPP: 1.4 (ref 0.9–1.1)
KETONES SERPL QL: NEGATIVE
KETONES UR QL STRIP.AUTO: ABNORMAL MG/DL
LACTATE SERPL-SCNC: 1.4 MMOL/L (ref 0.4–2)
LACTATE SERPL-SCNC: 2.5 MMOL/L (ref 0.4–2)
LEUKOCYTE ESTERASE UR QL STRIP.AUTO: NEGATIVE
LYMPHOCYTES # BLD: 0.4 K/UL (ref 0.8–3.5)
LYMPHOCYTES NFR BLD: 2 % (ref 12–49)
MAGNESIUM SERPL-MCNC: 1.7 MG/DL (ref 1.6–2.4)
MCH RBC QN AUTO: 29.1 PG (ref 26–34)
MCHC RBC AUTO-ENTMCNC: 34.5 G/DL (ref 30–36.5)
MCV RBC AUTO: 84.4 FL (ref 80–99)
MONOCYTES # BLD: 0.8 K/UL (ref 0–1)
MONOCYTES NFR BLD: 5 % (ref 5–13)
NEUTS SEG # BLD: 17 K/UL (ref 1.8–8)
NEUTS SEG NFR BLD: 92 % (ref 32–75)
NITRITE UR QL STRIP.AUTO: NEGATIVE
NRBC # BLD: 0 K/UL (ref 0–0.01)
NRBC BLD-RTO: 0 PER 100 WBC
PERFORMED BY:: ABNORMAL
PERFORMED BY:: ABNORMAL
PH UR STRIP: 5.5 (ref 5–8)
PLATELET # BLD AUTO: 147 K/UL (ref 150–400)
PMV BLD AUTO: 10.8 FL (ref 8.9–12.9)
POTASSIUM SERPL-SCNC: 3.6 MMOL/L (ref 3.5–5.1)
PROT SERPL-MCNC: 8 G/DL (ref 6.4–8.2)
PROT UR STRIP-MCNC: >300 MG/DL
PROTHROMBIN TIME: 16.3 SEC (ref 11.9–14.6)
RBC # BLD AUTO: 4.94 M/UL (ref 4.1–5.7)
RBC #/AREA URNS HPF: ABNORMAL /HPF (ref 0–3)
SARS-COV-2 RNA RESP QL NAA+PROBE: NOT DETECTED
SODIUM SERPL-SCNC: 129 MMOL/L (ref 136–145)
SP GR UR REFRACTOMETRY: 1.02 (ref 1–1.03)
UROBILINOGEN UR QL STRIP.AUTO: 1 EU/DL (ref 0.2–1)
WBC # BLD AUTO: 18.4 K/UL (ref 4.1–11.1)
WBC URNS QL MICRO: ABNORMAL /HPF (ref 0–5)

## 2023-06-26 PROCEDURE — 6360000002 HC RX W HCPCS: Performed by: EMERGENCY MEDICINE

## 2023-06-26 PROCEDURE — 87086 URINE CULTURE/COLONY COUNT: CPT

## 2023-06-26 PROCEDURE — 6370000000 HC RX 637 (ALT 250 FOR IP): Performed by: EMERGENCY MEDICINE

## 2023-06-26 PROCEDURE — 85025 COMPLETE CBC W/AUTO DIFF WBC: CPT

## 2023-06-26 PROCEDURE — 36415 COLL VENOUS BLD VENIPUNCTURE: CPT

## 2023-06-26 PROCEDURE — 74176 CT ABD & PELVIS W/O CONTRAST: CPT

## 2023-06-26 PROCEDURE — 83605 ASSAY OF LACTIC ACID: CPT

## 2023-06-26 PROCEDURE — 85610 PROTHROMBIN TIME: CPT

## 2023-06-26 PROCEDURE — 2580000003 HC RX 258: Performed by: INTERNAL MEDICINE

## 2023-06-26 PROCEDURE — 2580000003 HC RX 258: Performed by: EMERGENCY MEDICINE

## 2023-06-26 PROCEDURE — 82962 GLUCOSE BLOOD TEST: CPT

## 2023-06-26 PROCEDURE — 71045 X-RAY EXAM CHEST 1 VIEW: CPT

## 2023-06-26 PROCEDURE — 87040 BLOOD CULTURE FOR BACTERIA: CPT

## 2023-06-26 PROCEDURE — 82009 KETONE BODYS QUAL: CPT

## 2023-06-26 PROCEDURE — 81001 URINALYSIS AUTO W/SCOPE: CPT

## 2023-06-26 PROCEDURE — 96365 THER/PROPH/DIAG IV INF INIT: CPT

## 2023-06-26 PROCEDURE — 80053 COMPREHEN METABOLIC PANEL: CPT

## 2023-06-26 PROCEDURE — 83735 ASSAY OF MAGNESIUM: CPT

## 2023-06-26 PROCEDURE — 83036 HEMOGLOBIN GLYCOSYLATED A1C: CPT

## 2023-06-26 PROCEDURE — 1100000000 HC RM PRIVATE

## 2023-06-26 PROCEDURE — 87186 SC STD MICRODIL/AGAR DIL: CPT

## 2023-06-26 PROCEDURE — 87636 SARSCOV2 & INF A&B AMP PRB: CPT

## 2023-06-26 PROCEDURE — 99285 EMERGENCY DEPT VISIT HI MDM: CPT

## 2023-06-26 PROCEDURE — 87077 CULTURE AEROBIC IDENTIFY: CPT

## 2023-06-26 RX ORDER — ONDANSETRON 4 MG/1
4 TABLET, ORALLY DISINTEGRATING ORAL EVERY 8 HOURS PRN
Status: DISCONTINUED | OUTPATIENT
Start: 2023-06-26 | End: 2023-06-28 | Stop reason: HOSPADM

## 2023-06-26 RX ORDER — 0.9 % SODIUM CHLORIDE 0.9 %
1000 INTRAVENOUS SOLUTION INTRAVENOUS ONCE
Status: COMPLETED | OUTPATIENT
Start: 2023-06-26 | End: 2023-06-26

## 2023-06-26 RX ORDER — ONDANSETRON 2 MG/ML
4 INJECTION INTRAMUSCULAR; INTRAVENOUS EVERY 6 HOURS PRN
Status: DISCONTINUED | OUTPATIENT
Start: 2023-06-26 | End: 2023-06-26

## 2023-06-26 RX ORDER — ONDANSETRON 4 MG/1
4 TABLET, ORALLY DISINTEGRATING ORAL EVERY 8 HOURS PRN
Status: DISCONTINUED | OUTPATIENT
Start: 2023-06-26 | End: 2023-06-26

## 2023-06-26 RX ORDER — POLYETHYLENE GLYCOL 3350 17 G/17G
17 POWDER, FOR SOLUTION ORAL DAILY PRN
Status: DISCONTINUED | OUTPATIENT
Start: 2023-06-26 | End: 2023-06-28 | Stop reason: HOSPADM

## 2023-06-26 RX ORDER — MONTELUKAST SODIUM 10 MG/1
10 TABLET ORAL DAILY
Status: DISCONTINUED | OUTPATIENT
Start: 2023-06-27 | End: 2023-06-28 | Stop reason: HOSPADM

## 2023-06-26 RX ORDER — SODIUM CHLORIDE 0.9 % (FLUSH) 0.9 %
5-40 SYRINGE (ML) INJECTION PRN
Status: DISCONTINUED | OUTPATIENT
Start: 2023-06-26 | End: 2023-06-26

## 2023-06-26 RX ORDER — LEVOFLOXACIN 5 MG/ML
750 INJECTION, SOLUTION INTRAVENOUS EVERY 24 HOURS
Status: DISCONTINUED | OUTPATIENT
Start: 2023-06-27 | End: 2023-06-28 | Stop reason: HOSPADM

## 2023-06-26 RX ORDER — HYDROCORTISONE ACETATE 25 MG/1
25 SUPPOSITORY RECTAL 2 TIMES DAILY PRN
Status: DISCONTINUED | OUTPATIENT
Start: 2023-06-26 | End: 2023-06-28 | Stop reason: HOSPADM

## 2023-06-26 RX ORDER — TRAMADOL HYDROCHLORIDE 50 MG/1
50 TABLET ORAL 2 TIMES DAILY
COMMUNITY

## 2023-06-26 RX ORDER — ENOXAPARIN SODIUM 100 MG/ML
30 INJECTION SUBCUTANEOUS 2 TIMES DAILY
Status: DISCONTINUED | OUTPATIENT
Start: 2023-06-26 | End: 2023-06-26

## 2023-06-26 RX ORDER — INSULIN LISPRO 100 [IU]/ML
0-4 INJECTION, SOLUTION INTRAVENOUS; SUBCUTANEOUS NIGHTLY
Status: DISCONTINUED | OUTPATIENT
Start: 2023-06-26 | End: 2023-06-28 | Stop reason: HOSPADM

## 2023-06-26 RX ORDER — POLYETHYLENE GLYCOL 3350 17 G/17G
17 POWDER, FOR SOLUTION ORAL DAILY PRN
Status: DISCONTINUED | OUTPATIENT
Start: 2023-06-26 | End: 2023-06-26

## 2023-06-26 RX ORDER — SODIUM CHLORIDE 0.9 % (FLUSH) 0.9 %
5-40 SYRINGE (ML) INJECTION EVERY 12 HOURS SCHEDULED
Status: DISCONTINUED | OUTPATIENT
Start: 2023-06-26 | End: 2023-06-26

## 2023-06-26 RX ORDER — DEXTROSE MONOHYDRATE 100 MG/ML
INJECTION, SOLUTION INTRAVENOUS CONTINUOUS PRN
Status: DISCONTINUED | OUTPATIENT
Start: 2023-06-26 | End: 2023-06-28 | Stop reason: HOSPADM

## 2023-06-26 RX ORDER — ONDANSETRON 2 MG/ML
4 INJECTION INTRAMUSCULAR; INTRAVENOUS EVERY 6 HOURS PRN
Status: DISCONTINUED | OUTPATIENT
Start: 2023-06-26 | End: 2023-06-28 | Stop reason: HOSPADM

## 2023-06-26 RX ORDER — INSULIN LISPRO 100 [IU]/ML
0-8 INJECTION, SOLUTION INTRAVENOUS; SUBCUTANEOUS
Status: DISCONTINUED | OUTPATIENT
Start: 2023-06-27 | End: 2023-06-28 | Stop reason: HOSPADM

## 2023-06-26 RX ORDER — ACETAMINOPHEN 500 MG
1000 TABLET ORAL
Status: COMPLETED | OUTPATIENT
Start: 2023-06-26 | End: 2023-06-26

## 2023-06-26 RX ORDER — SODIUM CHLORIDE 0.9 % (FLUSH) 0.9 %
5-40 SYRINGE (ML) INJECTION EVERY 12 HOURS SCHEDULED
Status: DISCONTINUED | OUTPATIENT
Start: 2023-06-26 | End: 2023-06-28 | Stop reason: HOSPADM

## 2023-06-26 RX ORDER — SODIUM CHLORIDE 9 MG/ML
INJECTION, SOLUTION INTRAVENOUS PRN
Status: DISCONTINUED | OUTPATIENT
Start: 2023-06-26 | End: 2023-06-28 | Stop reason: HOSPADM

## 2023-06-26 RX ORDER — IBUPROFEN 800 MG/1
800 TABLET ORAL
Status: COMPLETED | OUTPATIENT
Start: 2023-06-26 | End: 2023-06-26

## 2023-06-26 RX ORDER — SODIUM CHLORIDE 9 MG/ML
INJECTION, SOLUTION INTRAVENOUS PRN
Status: DISCONTINUED | OUTPATIENT
Start: 2023-06-26 | End: 2023-06-26

## 2023-06-26 RX ORDER — ACETAMINOPHEN 325 MG/1
650 TABLET ORAL EVERY 6 HOURS PRN
Status: DISCONTINUED | OUTPATIENT
Start: 2023-06-26 | End: 2023-06-28 | Stop reason: HOSPADM

## 2023-06-26 RX ORDER — SODIUM CHLORIDE 0.9 % (FLUSH) 0.9 %
5-40 SYRINGE (ML) INJECTION PRN
Status: DISCONTINUED | OUTPATIENT
Start: 2023-06-26 | End: 2023-06-28 | Stop reason: HOSPADM

## 2023-06-26 RX ORDER — ENOXAPARIN SODIUM 100 MG/ML
30 INJECTION SUBCUTANEOUS 2 TIMES DAILY
Status: DISCONTINUED | OUTPATIENT
Start: 2023-06-26 | End: 2023-06-28 | Stop reason: HOSPADM

## 2023-06-26 RX ORDER — ACETAMINOPHEN 650 MG/1
650 SUPPOSITORY RECTAL EVERY 6 HOURS PRN
Status: DISCONTINUED | OUTPATIENT
Start: 2023-06-26 | End: 2023-06-28 | Stop reason: HOSPADM

## 2023-06-26 RX ADMIN — IBUPROFEN 800 MG: 800 TABLET, FILM COATED ORAL at 18:06

## 2023-06-26 RX ADMIN — SODIUM CHLORIDE 1000 ML: 9 INJECTION, SOLUTION INTRAVENOUS at 18:06

## 2023-06-26 RX ADMIN — SODIUM CHLORIDE 1000 ML: 9 INJECTION, SOLUTION INTRAVENOUS at 16:52

## 2023-06-26 RX ADMIN — ACETAMINOPHEN 1000 MG: 500 TABLET ORAL at 16:52

## 2023-06-26 RX ADMIN — SODIUM CHLORIDE 1000 ML: 9 INJECTION, SOLUTION INTRAVENOUS at 19:44

## 2023-06-26 RX ADMIN — SODIUM CHLORIDE, PRESERVATIVE FREE 10 ML: 5 INJECTION INTRAVENOUS at 19:41

## 2023-06-26 RX ADMIN — SODIUM CHLORIDE, PRESERVATIVE FREE 5 ML: 5 INJECTION INTRAVENOUS at 22:05

## 2023-06-26 RX ADMIN — CEFTRIAXONE SODIUM 1000 MG: 1 INJECTION, POWDER, FOR SOLUTION INTRAMUSCULAR; INTRAVENOUS at 19:42

## 2023-06-26 RX ADMIN — CEFTRIAXONE SODIUM 1000 MG: 1 INJECTION, POWDER, FOR SOLUTION INTRAMUSCULAR; INTRAVENOUS at 17:07

## 2023-06-26 ASSESSMENT — ENCOUNTER SYMPTOMS
DIARRHEA: 0
CONSTIPATION: 0
VOMITING: 0
COUGH: 0
WHEEZING: 0
NAUSEA: 0
SHORTNESS OF BREATH: 0
ABDOMINAL PAIN: 0

## 2023-06-26 ASSESSMENT — LIFESTYLE VARIABLES
HOW MANY STANDARD DRINKS CONTAINING ALCOHOL DO YOU HAVE ON A TYPICAL DAY: PATIENT DOES NOT DRINK
HOW OFTEN DO YOU HAVE A DRINK CONTAINING ALCOHOL: NEVER

## 2023-06-26 ASSESSMENT — PAIN DESCRIPTION - LOCATION: LOCATION: FLANK

## 2023-06-26 ASSESSMENT — PAIN SCALES - GENERAL
PAINLEVEL_OUTOF10: 6
PAINLEVEL_OUTOF10: 0
PAINLEVEL_OUTOF10: 0

## 2023-06-26 ASSESSMENT — PAIN - FUNCTIONAL ASSESSMENT: PAIN_FUNCTIONAL_ASSESSMENT: 0-10

## 2023-06-26 ASSESSMENT — PAIN DESCRIPTION - PAIN TYPE: TYPE: ACUTE PAIN

## 2023-06-27 LAB
ALBUMIN SERPL-MCNC: 2.8 G/DL (ref 3.5–5)
ALBUMIN/GLOB SERPL: 0.7 (ref 1.1–2.2)
ALP SERPL-CCNC: 124 U/L (ref 45–117)
ALT SERPL-CCNC: 39 U/L (ref 12–78)
ANION GAP SERPL CALC-SCNC: 9 MMOL/L (ref 5–15)
AST SERPL W P-5'-P-CCNC: 22 U/L (ref 15–37)
BASOPHILS # BLD: 0 K/UL (ref 0–0.2)
BASOPHILS NFR BLD: 0 % (ref 0–2.5)
BILIRUB SERPL-MCNC: 2.7 MG/DL (ref 0.2–1)
BUN SERPL-MCNC: 17 MG/DL (ref 6–20)
BUN/CREAT SERPL: 15 (ref 12–20)
CA-I BLD-MCNC: 8.3 MG/DL (ref 8.5–10.1)
CHLORIDE SERPL-SCNC: 102 MMOL/L (ref 97–108)
CO2 SERPL-SCNC: 27 MMOL/L (ref 21–32)
CREAT SERPL-MCNC: 1.17 MG/DL (ref 0.7–1.3)
EOSINOPHIL # BLD: 0 K/UL (ref 0–0.7)
EOSINOPHIL NFR BLD: 0 % (ref 0.9–2.9)
ERYTHROCYTE [DISTWIDTH] IN BLOOD BY AUTOMATED COUNT: 15.5 % (ref 11.5–14.5)
EST. AVERAGE GLUCOSE BLD GHB EST-MCNC: 126 MG/DL
GLOBULIN SER CALC-MCNC: 3.9 G/DL (ref 2–4)
GLUCOSE BLD STRIP.AUTO-MCNC: 173 MG/DL (ref 65–100)
GLUCOSE BLD STRIP.AUTO-MCNC: 184 MG/DL (ref 65–100)
GLUCOSE BLD STRIP.AUTO-MCNC: 185 MG/DL (ref 65–100)
GLUCOSE BLD STRIP.AUTO-MCNC: 202 MG/DL (ref 65–100)
GLUCOSE SERPL-MCNC: 172 MG/DL (ref 65–100)
HBA1C MFR BLD: 6 % (ref 4–5.6)
HCT VFR BLD AUTO: 37.8 % (ref 41–53)
HGB BLD-MCNC: 13 G/DL (ref 13.5–17.5)
INR PPP: 1.3 (ref 0.9–1.1)
LACTATE SERPL-SCNC: 1.6 MMOL/L (ref 0.4–2)
LYMPHOCYTES # BLD: 0.3 K/UL (ref 1–4.8)
LYMPHOCYTES NFR BLD: 2 % (ref 20.5–51.1)
MCH RBC QN AUTO: 29.4 PG (ref 31–34)
MCHC RBC AUTO-ENTMCNC: 34.4 G/DL (ref 31–36)
MCV RBC AUTO: 85.5 FL (ref 80–100)
MONOCYTES # BLD: 0.8 K/UL (ref 0.2–2.4)
MONOCYTES NFR BLD: 6 % (ref 1.7–9.3)
NEUTS SEG # BLD: 13.2 K/UL (ref 1.8–7.7)
NEUTS SEG NFR BLD: 92 % (ref 42–75)
NRBC # BLD: 0 K/UL
NRBC BLD-RTO: 0 PER 100 WBC
PERFORMED BY:: ABNORMAL
PLATELET # BLD AUTO: 106 K/UL (ref 150–400)
PMV BLD AUTO: 9.2 FL (ref 6.5–11.5)
POTASSIUM SERPL-SCNC: 3.8 MMOL/L (ref 3.5–5.1)
PROT SERPL-MCNC: 6.7 G/DL (ref 6.4–8.2)
PROTHROMBIN TIME: 15.8 SEC (ref 11.9–14.6)
RBC # BLD AUTO: 4.42 M/UL (ref 4.5–5.9)
SODIUM SERPL-SCNC: 138 MMOL/L (ref 136–145)
WBC # BLD AUTO: 14.4 K/UL (ref 4.4–11.3)

## 2023-06-27 PROCEDURE — 2580000003 HC RX 258: Performed by: INTERNAL MEDICINE

## 2023-06-27 PROCEDURE — 82962 GLUCOSE BLOOD TEST: CPT

## 2023-06-27 PROCEDURE — 6370000000 HC RX 637 (ALT 250 FOR IP): Performed by: INTERNAL MEDICINE

## 2023-06-27 PROCEDURE — 1100000000 HC RM PRIVATE

## 2023-06-27 PROCEDURE — 83605 ASSAY OF LACTIC ACID: CPT

## 2023-06-27 PROCEDURE — 80053 COMPREHEN METABOLIC PANEL: CPT

## 2023-06-27 PROCEDURE — 36415 COLL VENOUS BLD VENIPUNCTURE: CPT

## 2023-06-27 PROCEDURE — 85610 PROTHROMBIN TIME: CPT

## 2023-06-27 PROCEDURE — 85025 COMPLETE CBC W/AUTO DIFF WBC: CPT

## 2023-06-27 PROCEDURE — 6360000002 HC RX W HCPCS: Performed by: INTERNAL MEDICINE

## 2023-06-27 RX ORDER — ROSUVASTATIN CALCIUM 40 MG/1
40 TABLET, COATED ORAL EVERY EVENING
COMMUNITY

## 2023-06-27 RX ORDER — TAMSULOSIN HYDROCHLORIDE 0.4 MG/1
0.4 CAPSULE ORAL DAILY
COMMUNITY

## 2023-06-27 RX ADMIN — SODIUM CHLORIDE, PRESERVATIVE FREE 10 ML: 5 INJECTION INTRAVENOUS at 21:05

## 2023-06-27 RX ADMIN — LEVOFLOXACIN 750 MG: 5 INJECTION, SOLUTION INTRAVENOUS at 16:01

## 2023-06-27 RX ADMIN — ACETAMINOPHEN 650 MG: 325 TABLET, FILM COATED ORAL at 06:37

## 2023-06-27 RX ADMIN — INSULIN LISPRO 2 UNITS: 100 INJECTION, SOLUTION INTRAVENOUS; SUBCUTANEOUS at 07:40

## 2023-06-27 RX ADMIN — SODIUM CHLORIDE, PRESERVATIVE FREE 10 ML: 5 INJECTION INTRAVENOUS at 08:48

## 2023-06-27 RX ADMIN — ENOXAPARIN SODIUM 30 MG: 30 INJECTION SUBCUTANEOUS at 21:04

## 2023-06-27 RX ADMIN — ACETAMINOPHEN 650 MG: 325 TABLET, FILM COATED ORAL at 16:01

## 2023-06-27 RX ADMIN — MONTELUKAST 10 MG: 10 TABLET, FILM COATED ORAL at 08:48

## 2023-06-27 RX ADMIN — ENOXAPARIN SODIUM 30 MG: 30 INJECTION SUBCUTANEOUS at 08:48

## 2023-06-27 ASSESSMENT — PAIN DESCRIPTION - LOCATION: LOCATION: HEAD

## 2023-06-27 ASSESSMENT — PAIN - FUNCTIONAL ASSESSMENT: PAIN_FUNCTIONAL_ASSESSMENT: ACTIVITIES ARE NOT PREVENTED

## 2023-06-27 ASSESSMENT — PAIN SCALES - GENERAL
PAINLEVEL_OUTOF10: 0
PAINLEVEL_OUTOF10: 4

## 2023-06-27 ASSESSMENT — PAIN DESCRIPTION - DESCRIPTORS: DESCRIPTORS: ACHING

## 2023-06-27 ASSESSMENT — PAIN DESCRIPTION - ORIENTATION: ORIENTATION: ANTERIOR

## 2023-06-28 VITALS
HEIGHT: 78 IN | TEMPERATURE: 98.4 F | RESPIRATION RATE: 16 BRPM | DIASTOLIC BLOOD PRESSURE: 78 MMHG | SYSTOLIC BLOOD PRESSURE: 118 MMHG | WEIGHT: 268.6 LBS | HEART RATE: 80 BPM | OXYGEN SATURATION: 99 % | BODY MASS INDEX: 31.08 KG/M2

## 2023-06-28 LAB
GLUCOSE BLD STRIP.AUTO-MCNC: 127 MG/DL (ref 65–100)
PERFORMED BY:: ABNORMAL

## 2023-06-28 PROCEDURE — 82962 GLUCOSE BLOOD TEST: CPT

## 2023-06-28 PROCEDURE — 6370000000 HC RX 637 (ALT 250 FOR IP): Performed by: INTERNAL MEDICINE

## 2023-06-28 RX ORDER — BLOOD-GLUCOSE METER
1 KIT MISCELLANEOUS DAILY
Qty: 1 KIT | Refills: 0 | Status: SHIPPED | OUTPATIENT
Start: 2023-06-28

## 2023-06-28 RX ORDER — SULFAMETHOXAZOLE AND TRIMETHOPRIM 800; 160 MG/1; MG/1
1 TABLET ORAL 2 TIMES DAILY
Qty: 56 TABLET | Refills: 0 | Status: SHIPPED | OUTPATIENT
Start: 2023-06-28 | End: 2023-07-26

## 2023-06-28 RX ORDER — LANCETS 30 GAUGE
1 EACH MISCELLANEOUS 2 TIMES DAILY
Qty: 300 EACH | Refills: 1 | Status: SHIPPED | OUTPATIENT
Start: 2023-06-28

## 2023-06-28 RX ORDER — GLUCOSAMINE HCL/CHONDROITIN SU 500-400 MG
CAPSULE ORAL
Qty: 60 STRIP | Refills: 2 | Status: SHIPPED | OUTPATIENT
Start: 2023-06-28

## 2023-06-28 RX ADMIN — ACETAMINOPHEN 650 MG: 325 TABLET, FILM COATED ORAL at 05:28

## 2023-06-28 ASSESSMENT — PAIN SCALES - GENERAL: PAINLEVEL_OUTOF10: 0

## 2023-06-29 LAB
BACTERIA SPEC CULT: ABNORMAL
BACTERIA SPEC CULT: ABNORMAL
COLONY COUNT, CNT: ABNORMAL
Lab: ABNORMAL

## 2023-07-02 LAB
BACTERIA SPEC CULT: NORMAL
BACTERIA SPEC CULT: NORMAL
Lab: NORMAL
Lab: NORMAL

## 2023-07-24 ENCOUNTER — TELEPHONE (OUTPATIENT)
Facility: HOSPITAL | Age: 53
End: 2023-07-24

## 2023-07-24 NOTE — TELEPHONE ENCOUNTER
Followed up patient recent hospitalization for sepsis secondary to prostatitis, patient states he is doing well. Has followed up with Dr. Tristen Claros and appears that repeat urine test were performed and recently was placed on new abx and has follow up with Dr. Tristen Claros in 3 weeks. He also has a urology follow up in next 7-10 days. He was also found to have elevated glucose of 400 and was sent home on metformin. He says he checks his glucose in the mourning and usually it is in the 100 range and similarly on recheck during day is also in similar range. He is tolerating metformin medication.  At this time he has no further questions or concerns and will follow up closely with upcoming urology and PCP appt

## 2023-08-18 ENCOUNTER — APPOINTMENT (OUTPATIENT)
Facility: HOSPITAL | Age: 53
DRG: 690 | End: 2023-08-18
Payer: COMMERCIAL

## 2023-08-18 ENCOUNTER — HOSPITAL ENCOUNTER (INPATIENT)
Facility: HOSPITAL | Age: 53
LOS: 4 days | Discharge: HOME HEALTH | DRG: 690 | End: 2023-08-22
Attending: EMERGENCY MEDICINE | Admitting: INTERNAL MEDICINE
Payer: COMMERCIAL

## 2023-08-18 DIAGNOSIS — A49.9 ESBL (EXTENDED SPECTRUM BETA-LACTAMASE) PRODUCING BACTERIA INFECTION: ICD-10-CM

## 2023-08-18 DIAGNOSIS — N10 ACUTE PYELONEPHRITIS: Primary | ICD-10-CM

## 2023-08-18 DIAGNOSIS — Z16.12 ESBL (EXTENDED SPECTRUM BETA-LACTAMASE) PRODUCING BACTERIA INFECTION: ICD-10-CM

## 2023-08-18 PROBLEM — N12 PYELONEPHRITIS: Status: ACTIVE | Noted: 2023-08-18

## 2023-08-18 LAB
ALBUMIN SERPL-MCNC: 3.7 G/DL (ref 3.5–5)
ALBUMIN/GLOB SERPL: 0.9 (ref 1.1–2.2)
ALP SERPL-CCNC: 149 U/L (ref 45–117)
ALT SERPL-CCNC: 131 U/L (ref 12–78)
ANION GAP SERPL CALC-SCNC: 8 MMOL/L (ref 5–15)
APPEARANCE UR: CLEAR
AST SERPL W P-5'-P-CCNC: 54 U/L (ref 15–37)
BACTERIA URNS QL MICRO: ABNORMAL /HPF
BASOPHILS # BLD: 0 K/UL (ref 0–0.1)
BASOPHILS NFR BLD: 0 % (ref 0–1)
BILIRUB SERPL-MCNC: 0.8 MG/DL (ref 0.2–1)
BILIRUB UR QL: NEGATIVE
BUN SERPL-MCNC: 19 MG/DL (ref 6–20)
BUN/CREAT SERPL: 16 (ref 12–20)
CA-I BLD-MCNC: 9 MG/DL (ref 8.5–10.1)
CHLORIDE SERPL-SCNC: 101 MMOL/L (ref 97–108)
CO2 SERPL-SCNC: 28 MMOL/L (ref 21–32)
COLOR UR: ABNORMAL
CREAT SERPL-MCNC: 1.17 MG/DL (ref 0.7–1.3)
DIFFERENTIAL METHOD BLD: ABNORMAL
EOSINOPHIL # BLD: 0.1 K/UL (ref 0–0.4)
EOSINOPHIL NFR BLD: 1 % (ref 0–7)
ERYTHROCYTE [DISTWIDTH] IN BLOOD BY AUTOMATED COUNT: 15.2 % (ref 11.5–14.5)
FLUAV RNA SPEC QL NAA+PROBE: NOT DETECTED
FLUBV RNA SPEC QL NAA+PROBE: NOT DETECTED
GLOBULIN SER CALC-MCNC: 4.1 G/DL (ref 2–4)
GLUCOSE SERPL-MCNC: 96 MG/DL (ref 65–100)
GLUCOSE UR STRIP.AUTO-MCNC: NEGATIVE MG/DL
HCT VFR BLD AUTO: 37.3 % (ref 36.6–50.3)
HGB BLD-MCNC: 12.6 G/DL (ref 12.1–17)
HGB UR QL STRIP: ABNORMAL
IMM GRANULOCYTES # BLD AUTO: 0 K/UL (ref 0–0.04)
IMM GRANULOCYTES NFR BLD AUTO: 0 % (ref 0–0.5)
KETONES UR QL STRIP.AUTO: NEGATIVE MG/DL
LACTATE SERPL-SCNC: 0.6 MMOL/L (ref 0.4–2)
LEUKOCYTE ESTERASE UR QL STRIP.AUTO: NEGATIVE
LYMPHOCYTES # BLD: 1.3 K/UL (ref 0.8–3.5)
LYMPHOCYTES NFR BLD: 13 % (ref 12–49)
MCH RBC QN AUTO: 29.2 PG (ref 26–34)
MCHC RBC AUTO-ENTMCNC: 33.8 G/DL (ref 30–36.5)
MCV RBC AUTO: 86.5 FL (ref 80–99)
MONOCYTES # BLD: 0.7 K/UL (ref 0–1)
MONOCYTES NFR BLD: 8 % (ref 5–13)
NEUTS SEG # BLD: 7.4 K/UL (ref 1.8–8)
NEUTS SEG NFR BLD: 78 % (ref 32–75)
NITRITE UR QL STRIP.AUTO: NEGATIVE
NRBC # BLD: 0 K/UL (ref 0–0.01)
NRBC BLD-RTO: 0 PER 100 WBC
PH UR STRIP: 6 (ref 5–8)
PLATELET # BLD AUTO: 158 K/UL (ref 150–400)
PMV BLD AUTO: 10.6 FL (ref 8.9–12.9)
POTASSIUM SERPL-SCNC: 3.7 MMOL/L (ref 3.5–5.1)
PROT SERPL-MCNC: 7.8 G/DL (ref 6.4–8.2)
PROT UR STRIP-MCNC: 30 MG/DL
RBC # BLD AUTO: 4.31 M/UL (ref 4.1–5.7)
RBC #/AREA URNS HPF: ABNORMAL /HPF (ref 0–3)
SARS-COV-2 RNA RESP QL NAA+PROBE: NOT DETECTED
SODIUM SERPL-SCNC: 137 MMOL/L (ref 136–145)
SP GR UR REFRACTOMETRY: 1.02 (ref 1–1.03)
URINE CULTURE IF INDICATED: ABNORMAL
UROBILINOGEN UR QL STRIP.AUTO: 0.2 EU/DL (ref 0.2–1)
WBC # BLD AUTO: 9.6 K/UL (ref 4.1–11.1)
WBC URNS QL MICRO: ABNORMAL /HPF (ref 0–5)

## 2023-08-18 PROCEDURE — 80053 COMPREHEN METABOLIC PANEL: CPT

## 2023-08-18 PROCEDURE — 74177 CT ABD & PELVIS W/CONTRAST: CPT

## 2023-08-18 PROCEDURE — 85025 COMPLETE CBC W/AUTO DIFF WBC: CPT

## 2023-08-18 PROCEDURE — 99285 EMERGENCY DEPT VISIT HI MDM: CPT

## 2023-08-18 PROCEDURE — 6360000002 HC RX W HCPCS: Performed by: EMERGENCY MEDICINE

## 2023-08-18 PROCEDURE — 6360000004 HC RX CONTRAST MEDICATION: Performed by: EMERGENCY MEDICINE

## 2023-08-18 PROCEDURE — 36415 COLL VENOUS BLD VENIPUNCTURE: CPT

## 2023-08-18 PROCEDURE — 87636 SARSCOV2 & INF A&B AMP PRB: CPT

## 2023-08-18 PROCEDURE — 2060000000 HC ICU INTERMEDIATE R&B

## 2023-08-18 PROCEDURE — 96365 THER/PROPH/DIAG IV INF INIT: CPT

## 2023-08-18 PROCEDURE — 87040 BLOOD CULTURE FOR BACTERIA: CPT

## 2023-08-18 PROCEDURE — 81001 URINALYSIS AUTO W/SCOPE: CPT

## 2023-08-18 PROCEDURE — 2580000003 HC RX 258: Performed by: EMERGENCY MEDICINE

## 2023-08-18 PROCEDURE — 83605 ASSAY OF LACTIC ACID: CPT

## 2023-08-18 RX ORDER — SODIUM CHLORIDE 0.9 % (FLUSH) 0.9 %
5-40 SYRINGE (ML) INJECTION EVERY 12 HOURS SCHEDULED
Status: DISCONTINUED | OUTPATIENT
Start: 2023-08-19 | End: 2023-08-22 | Stop reason: HOSPADM

## 2023-08-18 RX ORDER — SODIUM CHLORIDE 0.9 % (FLUSH) 0.9 %
5-40 SYRINGE (ML) INJECTION PRN
Status: DISCONTINUED | OUTPATIENT
Start: 2023-08-18 | End: 2023-08-22 | Stop reason: HOSPADM

## 2023-08-18 RX ORDER — SODIUM CHLORIDE 9 MG/ML
INJECTION, SOLUTION INTRAVENOUS PRN
Status: DISCONTINUED | OUTPATIENT
Start: 2023-08-18 | End: 2023-08-22 | Stop reason: HOSPADM

## 2023-08-18 RX ORDER — ONDANSETRON 4 MG/1
4 TABLET, ORALLY DISINTEGRATING ORAL EVERY 8 HOURS PRN
Status: DISCONTINUED | OUTPATIENT
Start: 2023-08-18 | End: 2023-08-22 | Stop reason: HOSPADM

## 2023-08-18 RX ORDER — MONTELUKAST SODIUM 10 MG/1
10 TABLET ORAL DAILY
Status: DISCONTINUED | OUTPATIENT
Start: 2023-08-19 | End: 2023-08-19

## 2023-08-18 RX ORDER — POLYETHYLENE GLYCOL 3350 17 G/17G
17 POWDER, FOR SOLUTION ORAL DAILY PRN
Status: DISCONTINUED | OUTPATIENT
Start: 2023-08-18 | End: 2023-08-22 | Stop reason: HOSPADM

## 2023-08-18 RX ORDER — 0.9 % SODIUM CHLORIDE 0.9 %
1000 INTRAVENOUS SOLUTION INTRAVENOUS ONCE
Status: COMPLETED | OUTPATIENT
Start: 2023-08-18 | End: 2023-08-18

## 2023-08-18 RX ORDER — ENOXAPARIN SODIUM 100 MG/ML
30 INJECTION SUBCUTANEOUS 2 TIMES DAILY
Status: DISCONTINUED | OUTPATIENT
Start: 2023-08-19 | End: 2023-08-19

## 2023-08-18 RX ORDER — SODIUM CHLORIDE, SODIUM LACTATE, POTASSIUM CHLORIDE, CALCIUM CHLORIDE 600; 310; 30; 20 MG/100ML; MG/100ML; MG/100ML; MG/100ML
INJECTION, SOLUTION INTRAVENOUS CONTINUOUS
Status: DISCONTINUED | OUTPATIENT
Start: 2023-08-19 | End: 2023-08-21

## 2023-08-18 RX ORDER — ROSUVASTATIN CALCIUM 40 MG/1
40 TABLET, COATED ORAL EVERY EVENING
Status: DISCONTINUED | OUTPATIENT
Start: 2023-08-19 | End: 2023-08-19

## 2023-08-18 RX ORDER — CETIRIZINE HYDROCHLORIDE 10 MG/1
10 TABLET ORAL DAILY
Status: DISCONTINUED | OUTPATIENT
Start: 2023-08-19 | End: 2023-08-19

## 2023-08-18 RX ORDER — ACETAMINOPHEN 325 MG/1
650 TABLET ORAL ONCE
Status: COMPLETED | OUTPATIENT
Start: 2023-08-19 | End: 2023-08-19

## 2023-08-18 RX ORDER — TRAMADOL HYDROCHLORIDE 50 MG/1
50 TABLET ORAL EVERY 6 HOURS PRN
Status: DISCONTINUED | OUTPATIENT
Start: 2023-08-18 | End: 2023-08-22 | Stop reason: HOSPADM

## 2023-08-18 RX ORDER — TAMSULOSIN HYDROCHLORIDE 0.4 MG/1
0.4 CAPSULE ORAL DAILY
Status: DISCONTINUED | OUTPATIENT
Start: 2023-08-19 | End: 2023-08-22 | Stop reason: HOSPADM

## 2023-08-18 RX ORDER — MONTELUKAST SODIUM 10 MG/1
10 TABLET ORAL DAILY
Status: DISCONTINUED | OUTPATIENT
Start: 2023-08-19 | End: 2023-08-18

## 2023-08-18 RX ORDER — ONDANSETRON 2 MG/ML
4 INJECTION INTRAMUSCULAR; INTRAVENOUS EVERY 6 HOURS PRN
Status: DISCONTINUED | OUTPATIENT
Start: 2023-08-18 | End: 2023-08-22 | Stop reason: HOSPADM

## 2023-08-18 RX ADMIN — IOPAMIDOL 100 ML: 755 INJECTION, SOLUTION INTRAVENOUS at 19:06

## 2023-08-18 RX ADMIN — SODIUM CHLORIDE 1000 ML: 9 INJECTION, SOLUTION INTRAVENOUS at 17:43

## 2023-08-18 RX ADMIN — MEROPENEM 1000 MG: 1 INJECTION, POWDER, FOR SOLUTION INTRAVENOUS at 17:43

## 2023-08-18 ASSESSMENT — PAIN DESCRIPTION - ORIENTATION: ORIENTATION: LEFT

## 2023-08-18 ASSESSMENT — PAIN DESCRIPTION - LOCATION: LOCATION: FLANK

## 2023-08-18 ASSESSMENT — PAIN - FUNCTIONAL ASSESSMENT
PAIN_FUNCTIONAL_ASSESSMENT: 0-10
PAIN_FUNCTIONAL_ASSESSMENT: 0-10

## 2023-08-18 ASSESSMENT — PAIN SCALES - GENERAL
PAINLEVEL_OUTOF10: 3
PAINLEVEL_OUTOF10: 3
PAINLEVEL_OUTOF10: 0

## 2023-08-18 NOTE — ED TRIAGE NOTES
Pt states he has been having an ongoing fever and was sent by PCP for concern of sepsis.
Current every day smoker

## 2023-08-19 LAB
ALBUMIN SERPL-MCNC: 3.1 G/DL (ref 3.5–5)
ALBUMIN/GLOB SERPL: 0.8 (ref 1.1–2.2)
ALP SERPL-CCNC: 142 U/L (ref 45–117)
ALT SERPL-CCNC: 97 U/L (ref 12–78)
ANION GAP SERPL CALC-SCNC: 6 MMOL/L (ref 5–15)
AST SERPL W P-5'-P-CCNC: 38 U/L (ref 15–37)
BASOPHILS # BLD: 0 K/UL (ref 0–0.1)
BASOPHILS NFR BLD: 0 % (ref 0–1)
BILIRUB SERPL-MCNC: 0.7 MG/DL (ref 0.2–1)
BUN SERPL-MCNC: 15 MG/DL (ref 6–20)
BUN/CREAT SERPL: 15 (ref 12–20)
CA-I BLD-MCNC: 9 MG/DL (ref 8.5–10.1)
CHLORIDE SERPL-SCNC: 104 MMOL/L (ref 97–108)
CO2 SERPL-SCNC: 27 MMOL/L (ref 21–32)
CREAT SERPL-MCNC: 1.01 MG/DL (ref 0.7–1.3)
DIFFERENTIAL METHOD BLD: ABNORMAL
EOSINOPHIL # BLD: 0.1 K/UL (ref 0–0.4)
EOSINOPHIL NFR BLD: 2 % (ref 0–7)
ERYTHROCYTE [DISTWIDTH] IN BLOOD BY AUTOMATED COUNT: 15.1 % (ref 11.5–14.5)
GLOBULIN SER CALC-MCNC: 3.9 G/DL (ref 2–4)
GLUCOSE SERPL-MCNC: 91 MG/DL (ref 65–100)
HCT VFR BLD AUTO: 35 % (ref 36.6–50.3)
HGB BLD-MCNC: 11.5 G/DL (ref 12.1–17)
IMM GRANULOCYTES # BLD AUTO: 0 K/UL (ref 0–0.04)
IMM GRANULOCYTES NFR BLD AUTO: 0 % (ref 0–0.5)
LYMPHOCYTES # BLD: 1.4 K/UL (ref 0.8–3.5)
LYMPHOCYTES NFR BLD: 17 % (ref 12–49)
MCH RBC QN AUTO: 28.9 PG (ref 26–34)
MCHC RBC AUTO-ENTMCNC: 32.9 G/DL (ref 30–36.5)
MCV RBC AUTO: 87.9 FL (ref 80–99)
MONOCYTES # BLD: 1 K/UL (ref 0–1)
MONOCYTES NFR BLD: 13 % (ref 5–13)
NEUTS SEG # BLD: 5.4 K/UL (ref 1.8–8)
NEUTS SEG NFR BLD: 68 % (ref 32–75)
NRBC # BLD: 0 K/UL (ref 0–0.01)
NRBC BLD-RTO: 0 PER 100 WBC
PLATELET # BLD AUTO: 128 K/UL (ref 150–400)
PMV BLD AUTO: 11.6 FL (ref 8.9–12.9)
POTASSIUM SERPL-SCNC: 3.6 MMOL/L (ref 3.5–5.1)
PROT SERPL-MCNC: 7 G/DL (ref 6.4–8.2)
RBC # BLD AUTO: 3.98 M/UL (ref 4.1–5.7)
SODIUM SERPL-SCNC: 137 MMOL/L (ref 136–145)
WBC # BLD AUTO: 7.9 K/UL (ref 4.1–11.1)

## 2023-08-19 PROCEDURE — 86706 HEP B SURFACE ANTIBODY: CPT

## 2023-08-19 PROCEDURE — 80053 COMPREHEN METABOLIC PANEL: CPT

## 2023-08-19 PROCEDURE — 6370000000 HC RX 637 (ALT 250 FOR IP): Performed by: INTERNAL MEDICINE

## 2023-08-19 PROCEDURE — 2580000003 HC RX 258: Performed by: EMERGENCY MEDICINE

## 2023-08-19 PROCEDURE — 86704 HEP B CORE ANTIBODY TOTAL: CPT

## 2023-08-19 PROCEDURE — 6360000002 HC RX W HCPCS: Performed by: HOSPITALIST

## 2023-08-19 PROCEDURE — 2580000003 HC RX 258: Performed by: HOSPITALIST

## 2023-08-19 PROCEDURE — 6360000002 HC RX W HCPCS: Performed by: INTERNAL MEDICINE

## 2023-08-19 PROCEDURE — 6360000002 HC RX W HCPCS: Performed by: EMERGENCY MEDICINE

## 2023-08-19 PROCEDURE — 87040 BLOOD CULTURE FOR BACTERIA: CPT

## 2023-08-19 PROCEDURE — 2580000003 HC RX 258: Performed by: INTERNAL MEDICINE

## 2023-08-19 PROCEDURE — 6370000000 HC RX 637 (ALT 250 FOR IP): Performed by: HOSPITALIST

## 2023-08-19 PROCEDURE — 2060000000 HC ICU INTERMEDIATE R&B

## 2023-08-19 PROCEDURE — 85025 COMPLETE CBC W/AUTO DIFF WBC: CPT

## 2023-08-19 PROCEDURE — 86708 HEPATITIS A ANTIBODY: CPT

## 2023-08-19 PROCEDURE — 36415 COLL VENOUS BLD VENIPUNCTURE: CPT

## 2023-08-19 PROCEDURE — 87086 URINE CULTURE/COLONY COUNT: CPT

## 2023-08-19 PROCEDURE — 80074 ACUTE HEPATITIS PANEL: CPT

## 2023-08-19 RX ORDER — MONTELUKAST SODIUM 10 MG/1
10 TABLET ORAL EVERY EVENING
Status: DISCONTINUED | OUTPATIENT
Start: 2023-08-19 | End: 2023-08-22 | Stop reason: HOSPADM

## 2023-08-19 RX ORDER — ACETAMINOPHEN 325 MG/1
650 TABLET ORAL EVERY 6 HOURS PRN
Status: DISCONTINUED | OUTPATIENT
Start: 2023-08-19 | End: 2023-08-22 | Stop reason: HOSPADM

## 2023-08-19 RX ORDER — ENOXAPARIN SODIUM 100 MG/ML
30 INJECTION SUBCUTANEOUS 2 TIMES DAILY
Status: DISCONTINUED | OUTPATIENT
Start: 2023-08-19 | End: 2023-08-22 | Stop reason: HOSPADM

## 2023-08-19 RX ORDER — ROSUVASTATIN CALCIUM 40 MG/1
40 TABLET, COATED ORAL EVERY EVENING
Status: DISCONTINUED | OUTPATIENT
Start: 2023-08-19 | End: 2023-08-22 | Stop reason: HOSPADM

## 2023-08-19 RX ORDER — CETIRIZINE HYDROCHLORIDE 10 MG/1
10 TABLET ORAL EVERY EVENING
Status: DISCONTINUED | OUTPATIENT
Start: 2023-08-19 | End: 2023-08-22 | Stop reason: HOSPADM

## 2023-08-19 RX ADMIN — MONTELUKAST 10 MG: 10 TABLET, FILM COATED ORAL at 16:57

## 2023-08-19 RX ADMIN — CETIRIZINE HYDROCHLORIDE 10 MG: 10 TABLET, FILM COATED ORAL at 16:57

## 2023-08-19 RX ADMIN — SODIUM CHLORIDE, POTASSIUM CHLORIDE, SODIUM LACTATE AND CALCIUM CHLORIDE: 600; 310; 30; 20 INJECTION, SOLUTION INTRAVENOUS at 14:11

## 2023-08-19 RX ADMIN — ROSUVASTATIN CALCIUM 40 MG: 40 TABLET, FILM COATED ORAL at 02:27

## 2023-08-19 RX ADMIN — TAMSULOSIN HYDROCHLORIDE 0.4 MG: 0.4 CAPSULE ORAL at 08:47

## 2023-08-19 RX ADMIN — SODIUM CHLORIDE, PRESERVATIVE FREE 10 ML: 5 INJECTION INTRAVENOUS at 21:17

## 2023-08-19 RX ADMIN — ROSUVASTATIN CALCIUM 40 MG: 40 TABLET, FILM COATED ORAL at 16:57

## 2023-08-19 RX ADMIN — MONTELUKAST 10 MG: 10 TABLET, FILM COATED ORAL at 01:35

## 2023-08-19 RX ADMIN — MEROPENEM 1000 MG: 1 INJECTION, POWDER, FOR SOLUTION INTRAVENOUS at 16:57

## 2023-08-19 RX ADMIN — ACETAMINOPHEN 650 MG: 325 TABLET, FILM COATED ORAL at 01:35

## 2023-08-19 RX ADMIN — ENOXAPARIN SODIUM 30 MG: 100 INJECTION SUBCUTANEOUS at 20:51

## 2023-08-19 RX ADMIN — ENOXAPARIN SODIUM 30 MG: 100 INJECTION SUBCUTANEOUS at 02:21

## 2023-08-19 RX ADMIN — MEROPENEM 1000 MG: 1 INJECTION, POWDER, FOR SOLUTION INTRAVENOUS at 09:45

## 2023-08-19 RX ADMIN — MEROPENEM 1000 MG: 1 INJECTION, POWDER, FOR SOLUTION INTRAVENOUS at 02:10

## 2023-08-19 RX ADMIN — CETIRIZINE HYDROCHLORIDE 10 MG: 10 TABLET, FILM COATED ORAL at 01:35

## 2023-08-19 RX ADMIN — SODIUM CHLORIDE, POTASSIUM CHLORIDE, SODIUM LACTATE AND CALCIUM CHLORIDE: 600; 310; 30; 20 INJECTION, SOLUTION INTRAVENOUS at 01:35

## 2023-08-19 ASSESSMENT — PAIN SCALES - GENERAL
PAINLEVEL_OUTOF10: 4
PAINLEVEL_OUTOF10: 0
PAINLEVEL_OUTOF10: 4
PAINLEVEL_OUTOF10: 0

## 2023-08-19 ASSESSMENT — PAIN DESCRIPTION - LOCATION: LOCATION: HEAD

## 2023-08-19 ASSESSMENT — PAIN DESCRIPTION - DESCRIPTORS: DESCRIPTORS: ACHING

## 2023-08-19 NOTE — PROGRESS NOTES
HOSPITALIST PROGRESS NOTE  Sadie Loco MD, [address]           Daily Progress Note: 8/19/2023  Contact precautions secondary to history of ESBL UTI  Appropriate PPE donned and doffed. 48 y.o.  male with PMHx significant for non insulin dependent DM2, HLD, and prostatitis who presents to the emergency department complaining of fever and R sided flank pain. History of ESBL UTI. Found to be with pyelonephritis with 0/4 sirs criteria. He will be placed on the hospitalist service and given IV meropenem. Subjective:     Patient is alert and oriented x4. No fevers/chills, chest pain, shortness of breath, nausea vomiting, abdominal pain noted. Does note low-grade fevers and dysuria over the last 2 to 3 days with previous history of ESBL UTI secondary to renal stones. No urine cultures were performed in the ED. Counseled on obtaining urine culture and blood culture. CT Abdo/pelvis consistent with right-sided pyelonephritis along with several nonobstructing renal stones bilaterally.     Medications reviewed  Current Facility-Administered Medications   Medication Dose Route Frequency    cetirizine (ZYRTEC) tablet 10 mg  10 mg Oral QPM    montelukast (SINGULAIR) tablet 10 mg  10 mg Oral QPM    rosuvastatin (CRESTOR) tablet 40 mg  40 mg Oral QPM    enoxaparin Sodium (LOVENOX) injection 30 mg  30 mg SubCUTAneous BID    meropenem (MERREM) 1,000 mg in sodium chloride 0.9 % 100 mL IVPB (mini-bag)  1,000 mg IntraVENous Q8H    sodium chloride flush 0.9 % injection 5-40 mL  5-40 mL IntraVENous 2 times per day    sodium chloride flush 0.9 % injection 5-40 mL  5-40 mL IntraVENous PRN    0.9 % sodium chloride infusion   IntraVENous PRN    ondansetron (ZOFRAN-ODT) disintegrating tablet 4 mg  4 mg Oral Q8H PRN    Or    ondansetron (ZOFRAN) injection 4 mg  4 mg IntraVENous Q6H PRN    polyethylene glycol (GLYCOLAX) packet 17 g  17 g

## 2023-08-19 NOTE — PROGRESS NOTES
The pt have been resting since being admitted. The Iv is infusing as ordered.& he did receive his ordered meds. Will continue to monitor.

## 2023-08-19 NOTE — ED NOTES
Report called to Nurse ABBOTT [facility], Acute Care. Pt transported to floor via wheelchair.       Thais Peñaloza RN  08/18/23 2042

## 2023-08-19 NOTE — ED NOTES
Attempted to call report to floor, staff unavailable at this time, will call back.      Aliya Farnsworth RN  08/18/23 2034

## 2023-08-19 NOTE — H&P
THIS VISIT WAS PERFORMED VIA TELEMEDICINE WITH REAL TIME VIDEO AND AUDIO CONFERENCING    Cc-R flank pain, fever        Hospitalist Admission Note    NAME:   Oralia Perez   : 1970   MRN: [id number]     Date/Time: 2023 11:44 PM    Patient PCP: Annalisa Rocha MD    ______________________________________________________________________  Given the patient's current clinical presentation, I have a high level of concern for decompensation if discharged from the emergency department. Complex decision making was performed, which includes reviewing the patient's available past medical records, laboratory results, and x-ray films. My assessment of this patient's clinical condition and my plan of care is as follows. Assessment / Plan:    48 y.o.  male with PMHx significant for non insulin dependent DM2, HLD, and prostatitis who presents to the emergency department complaining of fever and R sided flank pain. History of ESBL UTI. Found to be with pyelonephritis with 0/4 sirs criteria. He will be placed on the hospitalist service and given IV meropenem. Pyelonephritis: 0/4 SIRs, history of ESBL+  -meorpenem  -urine culture obtain in the ED, will follow  -blood culture obtained in the ED will follow  -IVFs    2. HLD:  -continue home statin    3. Non insulin dependent DM2  -hold metformin  -SSI-insulin sensitive with accuchecks    4. Elevated LFTs:  -hepatitis panel  -liver US                    Medical Decision Making:   I personally reviewed labs: elevated LFTs  I personally reviewed imaging:R sided pyelonephritis  Discussed case with: ED provider. After discussion I am in agreement that acuity of patient's medical condition necessitates hospital stay.       Code Status: Full  DVT Prophylaxis: Lovenox      Subjective:   CHIEF COMPLAINT: R sided flank pain, fever    HISTORY OF PRESENT ILLNESS:     Oralia Perez is a 48 y.o.  male with PMHx significant for non insulin dependent DM2,

## 2023-08-19 NOTE — PROGRESS NOTES
The pt have rested well since being admitted. He have tolerated being up to the bathroom with his Iv infusing. Will continue to monitor.

## 2023-08-19 NOTE — ED NOTES
Contacted [facility] regarding pending admission status for dx pyelonephritis. Pt aware of need for admission and agreeable with same.       Kostas Acuna RN  08/18/23 2021

## 2023-08-20 LAB
ALBUMIN SERPL-MCNC: 3 G/DL (ref 3.5–5)
ALBUMIN/GLOB SERPL: 0.8 (ref 1.1–2.2)
ALP SERPL-CCNC: 162 U/L (ref 45–117)
ALT SERPL-CCNC: 82 U/L (ref 12–78)
ANION GAP SERPL CALC-SCNC: 6 MMOL/L (ref 5–15)
AST SERPL W P-5'-P-CCNC: 27 U/L (ref 15–37)
BACTERIA SPEC CULT: NORMAL
BASOPHILS # BLD: 0 K/UL (ref 0–0.2)
BASOPHILS NFR BLD: 0 % (ref 0–2.5)
BILIRUB SERPL-MCNC: 0.6 MG/DL (ref 0.2–1)
BUN SERPL-MCNC: 13 MG/DL (ref 6–20)
BUN/CREAT SERPL: 13 (ref 12–20)
CA-I BLD-MCNC: 8.9 MG/DL (ref 8.5–10.1)
CHLORIDE SERPL-SCNC: 102 MMOL/L (ref 97–108)
CO2 SERPL-SCNC: 28 MMOL/L (ref 21–32)
CREAT SERPL-MCNC: 0.98 MG/DL (ref 0.7–1.3)
EOSINOPHIL # BLD: 0.1 K/UL (ref 0–0.7)
EOSINOPHIL NFR BLD: 2 % (ref 0.9–2.9)
ERYTHROCYTE [DISTWIDTH] IN BLOOD BY AUTOMATED COUNT: 16 % (ref 11.5–14.5)
GLOBULIN SER CALC-MCNC: 4 G/DL (ref 2–4)
GLUCOSE SERPL-MCNC: 106 MG/DL (ref 65–100)
HCT VFR BLD AUTO: 36.4 % (ref 41–53)
HGB BLD-MCNC: 12.2 G/DL (ref 13.5–17.5)
LYMPHOCYTES # BLD: 1.2 K/UL (ref 1–4.8)
LYMPHOCYTES NFR BLD: 17 % (ref 20.5–51.1)
Lab: NORMAL
MCH RBC QN AUTO: 28.9 PG (ref 31–34)
MCHC RBC AUTO-ENTMCNC: 33.6 G/DL (ref 31–36)
MCV RBC AUTO: 86 FL (ref 80–100)
MONOCYTES # BLD: 0.7 K/UL (ref 0.2–2.4)
MONOCYTES NFR BLD: 10 % (ref 1.7–9.3)
NEUTS SEG # BLD: 5.1 K/UL (ref 1.8–7.7)
NEUTS SEG NFR BLD: 71 % (ref 42–75)
NRBC # BLD: 0.01 K/UL
NRBC BLD-RTO: 0.1 PER 100 WBC
PLATELET # BLD AUTO: 166 K/UL (ref 150–400)
PMV BLD AUTO: 8.7 FL (ref 6.5–11.5)
POTASSIUM SERPL-SCNC: 3.8 MMOL/L (ref 3.5–5.1)
PROT SERPL-MCNC: 7 G/DL (ref 6.4–8.2)
RBC # BLD AUTO: 4.23 M/UL (ref 4.5–5.9)
SODIUM SERPL-SCNC: 136 MMOL/L (ref 136–145)
WBC # BLD AUTO: 7.1 K/UL (ref 4.4–11.3)

## 2023-08-20 PROCEDURE — 2580000003 HC RX 258: Performed by: HOSPITALIST

## 2023-08-20 PROCEDURE — 36415 COLL VENOUS BLD VENIPUNCTURE: CPT

## 2023-08-20 PROCEDURE — 85025 COMPLETE CBC W/AUTO DIFF WBC: CPT

## 2023-08-20 PROCEDURE — 80053 COMPREHEN METABOLIC PANEL: CPT

## 2023-08-20 PROCEDURE — 2580000003 HC RX 258: Performed by: INTERNAL MEDICINE

## 2023-08-20 PROCEDURE — 6370000000 HC RX 637 (ALT 250 FOR IP): Performed by: HOSPITALIST

## 2023-08-20 PROCEDURE — 2060000000 HC ICU INTERMEDIATE R&B

## 2023-08-20 PROCEDURE — 6370000000 HC RX 637 (ALT 250 FOR IP): Performed by: INTERNAL MEDICINE

## 2023-08-20 PROCEDURE — 6360000002 HC RX W HCPCS: Performed by: INTERNAL MEDICINE

## 2023-08-20 RX ADMIN — SODIUM CHLORIDE, PRESERVATIVE FREE 10 ML: 5 INJECTION INTRAVENOUS at 20:51

## 2023-08-20 RX ADMIN — SODIUM CHLORIDE, POTASSIUM CHLORIDE, SODIUM LACTATE AND CALCIUM CHLORIDE: 600; 310; 30; 20 INJECTION, SOLUTION INTRAVENOUS at 01:36

## 2023-08-20 RX ADMIN — ROSUVASTATIN CALCIUM 40 MG: 40 TABLET, FILM COATED ORAL at 17:27

## 2023-08-20 RX ADMIN — ENOXAPARIN SODIUM 30 MG: 100 INJECTION SUBCUTANEOUS at 20:39

## 2023-08-20 RX ADMIN — TAMSULOSIN HYDROCHLORIDE 0.4 MG: 0.4 CAPSULE ORAL at 08:18

## 2023-08-20 RX ADMIN — MEROPENEM 1000 MG: 1 INJECTION, POWDER, FOR SOLUTION INTRAVENOUS at 01:31

## 2023-08-20 RX ADMIN — SODIUM CHLORIDE, POTASSIUM CHLORIDE, SODIUM LACTATE AND CALCIUM CHLORIDE: 600; 310; 30; 20 INJECTION, SOLUTION INTRAVENOUS at 15:25

## 2023-08-20 RX ADMIN — ENOXAPARIN SODIUM 30 MG: 100 INJECTION SUBCUTANEOUS at 08:19

## 2023-08-20 RX ADMIN — CETIRIZINE HYDROCHLORIDE 10 MG: 10 TABLET, FILM COATED ORAL at 17:27

## 2023-08-20 RX ADMIN — MEROPENEM 1000 MG: 1 INJECTION, POWDER, FOR SOLUTION INTRAVENOUS at 17:27

## 2023-08-20 RX ADMIN — MONTELUKAST 10 MG: 10 TABLET, FILM COATED ORAL at 17:27

## 2023-08-20 RX ADMIN — MEROPENEM 1000 MG: 1 INJECTION, POWDER, FOR SOLUTION INTRAVENOUS at 09:56

## 2023-08-20 ASSESSMENT — PAIN SCALES - GENERAL
PAINLEVEL_OUTOF10: 0

## 2023-08-20 NOTE — PROGRESS NOTES
HOSPITALIST PROGRESS NOTE  Pratibha Fong MD, [address]           Daily Progress Note: 8/20/2023  Contact precautions secondary to history of ESBL UTI  Appropriate PPE donned and doffed. 48 y.o.  male with PMHx significant for non insulin dependent DM2, HLD, and prostatitis who presents to the emergency department complaining of fever and R sided flank pain. History of ESBL UTI. Found to be with pyelonephritis with 0/4 sirs criteria. He will be placed on the hospitalist service and given IV meropenem. Subjective:     Patient is alert and oriented x4. No chest pain, shortness of breath, nausea vomiting, abdominal pain noted. Significant fever of 101.5 noted around 7 PM.    At home, low-grade fevers and dysuria over the last 2 to 3 days with previous history of ESBL UTI secondary to renal stones. Urine cultures and blood cultures negative thus far. CT Abdo/pelvis consistent with right-sided pyelonephritis along with several nonobstructing renal stones bilaterally. Counseled continuing on current treatment given CT Abdo/pelvis findings of pyelonephritis.     Medications reviewed  Current Facility-Administered Medications   Medication Dose Route Frequency    cetirizine (ZYRTEC) tablet 10 mg  10 mg Oral QPM    montelukast (SINGULAIR) tablet 10 mg  10 mg Oral QPM    rosuvastatin (CRESTOR) tablet 40 mg  40 mg Oral QPM    enoxaparin Sodium (LOVENOX) injection 30 mg  30 mg SubCUTAneous BID    meropenem (MERREM) 1,000 mg in sodium chloride 0.9 % 100 mL IVPB (mini-bag)  1,000 mg IntraVENous Q8H    acetaminophen (TYLENOL) tablet 650 mg  650 mg Oral Q6H PRN    sodium chloride flush 0.9 % injection 5-40 mL  5-40 mL IntraVENous 2 times per day    sodium chloride flush 0.9 % injection 5-40 mL  5-40 mL IntraVENous PRN    0.9 % sodium chloride infusion   IntraVENous PRN    ondansetron (ZOFRAN-ODT) disintegrating tablet 4 mg  4 mg

## 2023-08-20 NOTE — PROGRESS NOTES
Up ad veronica. Ambulating in room to BR to void. IVF in progress. Ate well. No c/o. No pain. Afebrile.

## 2023-08-20 NOTE — PROGRESS NOTES
Patient had fever at 1942  No tylenol order, informed on call ordered tylenol every 6hrs. Tipid sponge bath done . Present temperature is 99.9. Tylenol not given.

## 2023-08-21 ENCOUNTER — APPOINTMENT (OUTPATIENT)
Facility: HOSPITAL | Age: 53
DRG: 690 | End: 2023-08-21
Payer: COMMERCIAL

## 2023-08-21 LAB
ALBUMIN SERPL-MCNC: 3.2 G/DL (ref 3.5–5)
ALBUMIN/GLOB SERPL: 0.7 (ref 1.1–2.2)
ALP SERPL-CCNC: 193 U/L (ref 45–117)
ALT SERPL-CCNC: 85 U/L (ref 12–78)
ANION GAP SERPL CALC-SCNC: 9 MMOL/L (ref 5–15)
AST SERPL W P-5'-P-CCNC: 31 U/L (ref 15–37)
BASOPHILS # BLD: 0 K/UL (ref 0–0.1)
BASOPHILS NFR BLD: 0 % (ref 0–1)
BILIRUB SERPL-MCNC: 0.5 MG/DL (ref 0.2–1)
BUN SERPL-MCNC: 15 MG/DL (ref 6–20)
BUN/CREAT SERPL: 14 (ref 12–20)
CA-I BLD-MCNC: 9.5 MG/DL (ref 8.5–10.1)
CHLORIDE SERPL-SCNC: 105 MMOL/L (ref 97–108)
CO2 SERPL-SCNC: 28 MMOL/L (ref 21–32)
CREAT SERPL-MCNC: 1.05 MG/DL (ref 0.7–1.3)
DIFFERENTIAL METHOD BLD: ABNORMAL
EOSINOPHIL # BLD: 0.2 K/UL (ref 0–0.4)
EOSINOPHIL NFR BLD: 3 % (ref 0–7)
ERYTHROCYTE [DISTWIDTH] IN BLOOD BY AUTOMATED COUNT: 14.9 % (ref 11.5–14.5)
GLOBULIN SER CALC-MCNC: 4.6 G/DL (ref 2–4)
GLUCOSE SERPL-MCNC: 111 MG/DL (ref 65–100)
HCT VFR BLD AUTO: 38.8 % (ref 36.6–50.3)
HGB BLD-MCNC: 12.8 G/DL (ref 12.1–17)
IMM GRANULOCYTES # BLD AUTO: 0 K/UL (ref 0–0.04)
IMM GRANULOCYTES NFR BLD AUTO: 0 % (ref 0–0.5)
LYMPHOCYTES # BLD: 1.4 K/UL (ref 0.8–3.5)
LYMPHOCYTES NFR BLD: 18 % (ref 12–49)
MCH RBC QN AUTO: 28.5 PG (ref 26–34)
MCHC RBC AUTO-ENTMCNC: 33 G/DL (ref 30–36.5)
MCV RBC AUTO: 86.4 FL (ref 80–99)
MONOCYTES # BLD: 0.7 K/UL (ref 0–1)
MONOCYTES NFR BLD: 9 % (ref 5–13)
NEUTS SEG # BLD: 5.3 K/UL (ref 1.8–8)
NEUTS SEG NFR BLD: 70 % (ref 32–75)
NRBC # BLD: 0 K/UL (ref 0–0.01)
NRBC BLD-RTO: 0 PER 100 WBC
PLATELET # BLD AUTO: 202 K/UL (ref 150–400)
PMV BLD AUTO: 11.4 FL (ref 8.9–12.9)
POTASSIUM SERPL-SCNC: 4.1 MMOL/L (ref 3.5–5.1)
PROT SERPL-MCNC: 7.8 G/DL (ref 6.4–8.2)
RBC # BLD AUTO: 4.49 M/UL (ref 4.1–5.7)
SODIUM SERPL-SCNC: 142 MMOL/L (ref 136–145)
WBC # BLD AUTO: 7.7 K/UL (ref 4.1–11.1)

## 2023-08-21 PROCEDURE — 2580000003 HC RX 258: Performed by: HOSPITALIST

## 2023-08-21 PROCEDURE — 80053 COMPREHEN METABOLIC PANEL: CPT

## 2023-08-21 PROCEDURE — 36415 COLL VENOUS BLD VENIPUNCTURE: CPT

## 2023-08-21 PROCEDURE — 2060000000 HC ICU INTERMEDIATE R&B

## 2023-08-21 PROCEDURE — 85025 COMPLETE CBC W/AUTO DIFF WBC: CPT

## 2023-08-21 PROCEDURE — 6370000000 HC RX 637 (ALT 250 FOR IP): Performed by: INTERNAL MEDICINE

## 2023-08-21 PROCEDURE — 6370000000 HC RX 637 (ALT 250 FOR IP): Performed by: HOSPITALIST

## 2023-08-21 PROCEDURE — 6360000002 HC RX W HCPCS: Performed by: INTERNAL MEDICINE

## 2023-08-21 PROCEDURE — 76705 ECHO EXAM OF ABDOMEN: CPT

## 2023-08-21 PROCEDURE — 2580000003 HC RX 258: Performed by: INTERNAL MEDICINE

## 2023-08-21 RX ADMIN — CETIRIZINE HYDROCHLORIDE 10 MG: 10 TABLET, FILM COATED ORAL at 17:37

## 2023-08-21 RX ADMIN — SODIUM CHLORIDE, PRESERVATIVE FREE 10 ML: 5 INJECTION INTRAVENOUS at 20:59

## 2023-08-21 RX ADMIN — MEROPENEM 1000 MG: 1 INJECTION, POWDER, FOR SOLUTION INTRAVENOUS at 09:25

## 2023-08-21 RX ADMIN — TAMSULOSIN HYDROCHLORIDE 0.4 MG: 0.4 CAPSULE ORAL at 09:24

## 2023-08-21 RX ADMIN — MEROPENEM 1000 MG: 1 INJECTION, POWDER, FOR SOLUTION INTRAVENOUS at 17:37

## 2023-08-21 RX ADMIN — MONTELUKAST 10 MG: 10 TABLET, FILM COATED ORAL at 17:36

## 2023-08-21 RX ADMIN — MEROPENEM 1000 MG: 1 INJECTION, POWDER, FOR SOLUTION INTRAVENOUS at 02:03

## 2023-08-21 RX ADMIN — ENOXAPARIN SODIUM 30 MG: 100 INJECTION SUBCUTANEOUS at 20:47

## 2023-08-21 RX ADMIN — SODIUM CHLORIDE, POTASSIUM CHLORIDE, SODIUM LACTATE AND CALCIUM CHLORIDE: 600; 310; 30; 20 INJECTION, SOLUTION INTRAVENOUS at 06:07

## 2023-08-21 RX ADMIN — ENOXAPARIN SODIUM 30 MG: 100 INJECTION SUBCUTANEOUS at 09:24

## 2023-08-21 RX ADMIN — ROSUVASTATIN CALCIUM 40 MG: 40 TABLET, FILM COATED ORAL at 17:36

## 2023-08-21 ASSESSMENT — PAIN SCALES - GENERAL
PAINLEVEL_OUTOF10: 0

## 2023-08-21 NOTE — PROGRESS NOTES
Consultation with ID, Dr. Gina Vizcaino at [facility] regarding patient's history of ESBL UTI in June as well as persistent bilateral nonobstructing renal stones and possible right-sided 35 nephritis, he recommends IV meropenem given previous sensitivities from ESBL Klebsiella for 14 days total.    Patient has already received 3 days of IV meropenem. Will obtain PICC consult and case management evaluation for home health IV meropenem administration. Patient to require IV meropenem 1 g every 8 hours x11 more days.

## 2023-08-21 NOTE — PROGRESS NOTES
Bedside shift change report given to dante cuevas(oncoming nurse) by Charles Eason (offgoing nurse). Report included the following information Nurse Handoff Report.

## 2023-08-21 NOTE — PROGRESS NOTES
HOSPITALIST PROGRESS NOTE  David Dotson MD, [address]           Daily Progress Note: 8/21/2023  Contact precautions secondary to history of ESBL UTI  Appropriate PPE donned and doffed. 48 y.o.  male with PMHx significant for non insulin dependent DM2, HLD, and prostatitis who presents to the emergency department complaining of fever and R sided flank pain. History of ESBL UTI. Found to be with pyelonephritis with 0/4 sirs criteria. He will be placed on the hospitalist service and given IV meropenem. Subjective:     Patient is alert and oriented x4. No chest pain, shortness of breath, nausea vomiting, abdominal pain noted. Since IV meropenem started, high-grade fevers have resolved over the last 36 hours. At home, low-grade fevers and dysuria over the last 2 to 3 days with previous history of ESBL UTI secondary to renal stones. Urine cultures and blood cultures negative. CT Abdo/pelvis consistent with right-sided pyelonephritis along with several nonobstructing renal stones bilaterally. Spoke to ID, Dr. Sandee Calix telephonically at [facility] extensively regarding patient's history of ESBL Klebsiella UTI in June 2023 with recurrent acute nephritis and nonobstructing renal stones. He recommended continuing IV meropenem for total of 14 days given recurrent nature of fevers likely from renal stones. Patient counseled extensively and will place PICC today for home IV meropenem for an additional 10 days the patient likely discharge on 8/22/2023. Patient is agreeable with POC.     Medications reviewed  Current Facility-Administered Medications   Medication Dose Route Frequency    cetirizine (ZYRTEC) tablet 10 mg  10 mg Oral QPM    montelukast (SINGULAIR) tablet 10 mg  10 mg Oral QPM    rosuvastatin (CRESTOR) tablet 40 mg  40 mg Oral QPM    enoxaparin Sodium (LOVENOX) injection 30 mg  30 mg SubCUTAneous BID    meropenem

## 2023-08-22 VITALS
RESPIRATION RATE: 18 BRPM | TEMPERATURE: 99 F | OXYGEN SATURATION: 99 % | SYSTOLIC BLOOD PRESSURE: 110 MMHG | DIASTOLIC BLOOD PRESSURE: 73 MMHG | BODY MASS INDEX: 39.9 KG/M2 | WEIGHT: 285 LBS | HEART RATE: 60 BPM | HEIGHT: 71 IN

## 2023-08-22 LAB
ALBUMIN SERPL-MCNC: 3.1 G/DL (ref 3.5–5)
ALBUMIN/GLOB SERPL: 0.7 (ref 1.1–2.2)
ALP SERPL-CCNC: 189 U/L (ref 45–117)
ALT SERPL-CCNC: 82 U/L (ref 12–78)
ANION GAP SERPL CALC-SCNC: 7 MMOL/L (ref 5–15)
AST SERPL W P-5'-P-CCNC: 33 U/L (ref 15–37)
BASOPHILS # BLD: 0 K/UL (ref 0–0.1)
BASOPHILS NFR BLD: 0 % (ref 0–1)
BILIRUB SERPL-MCNC: 0.5 MG/DL (ref 0.2–1)
BUN SERPL-MCNC: 16 MG/DL (ref 6–20)
BUN/CREAT SERPL: 15 (ref 12–20)
CA-I BLD-MCNC: 9.3 MG/DL (ref 8.5–10.1)
CHLORIDE SERPL-SCNC: 105 MMOL/L (ref 97–108)
CO2 SERPL-SCNC: 29 MMOL/L (ref 21–32)
CREAT SERPL-MCNC: 1.08 MG/DL (ref 0.7–1.3)
DIFFERENTIAL METHOD BLD: ABNORMAL
EOSINOPHIL # BLD: 0.2 K/UL (ref 0–0.4)
EOSINOPHIL NFR BLD: 2 % (ref 0–7)
ERYTHROCYTE [DISTWIDTH] IN BLOOD BY AUTOMATED COUNT: 14.8 % (ref 11.5–14.5)
GLOBULIN SER CALC-MCNC: 4.4 G/DL (ref 2–4)
GLUCOSE SERPL-MCNC: 104 MG/DL (ref 65–100)
HAV AB SER QL IA: POSITIVE
HAV IGM SERPL QL IA: NEGATIVE
HBV CORE AB SERPL QL IA: NEGATIVE
HBV CORE IGM SERPL QL IA: NEGATIVE
HBV SURFACE AB SER QL: NON REACTIVE INDEX VALUE
HBV SURFACE AG SERPL QL IA: NEGATIVE
HCT VFR BLD AUTO: 36.8 % (ref 36.6–50.3)
HCV AB SERPL QL IA: NORMAL
HCV IGG SERPL QL IA: NON REACTIVE
HGB BLD-MCNC: 12.4 G/DL (ref 12.1–17)
IMM GRANULOCYTES # BLD AUTO: 0 K/UL (ref 0–0.04)
IMM GRANULOCYTES NFR BLD AUTO: 0 % (ref 0–0.5)
LYMPHOCYTES # BLD: 1.3 K/UL (ref 0.8–3.5)
LYMPHOCYTES NFR BLD: 17 % (ref 12–49)
MCH RBC QN AUTO: 28.7 PG (ref 26–34)
MCHC RBC AUTO-ENTMCNC: 33.7 G/DL (ref 30–36.5)
MCV RBC AUTO: 85.2 FL (ref 80–99)
MONOCYTES # BLD: 0.6 K/UL (ref 0–1)
MONOCYTES NFR BLD: 8 % (ref 5–13)
NEUTS SEG # BLD: 5.6 K/UL (ref 1.8–8)
NEUTS SEG NFR BLD: 73 % (ref 32–75)
NRBC # BLD: 0 K/UL (ref 0–0.01)
NRBC BLD-RTO: 0 PER 100 WBC
PLATELET # BLD AUTO: 203 K/UL (ref 150–400)
PMV BLD AUTO: 10.4 FL (ref 8.9–12.9)
POTASSIUM SERPL-SCNC: 4.1 MMOL/L (ref 3.5–5.1)
PROT SERPL-MCNC: 7.5 G/DL (ref 6.4–8.2)
RBC # BLD AUTO: 4.32 M/UL (ref 4.1–5.7)
SODIUM SERPL-SCNC: 141 MMOL/L (ref 136–145)
WBC # BLD AUTO: 7.6 K/UL (ref 4.1–11.1)

## 2023-08-22 PROCEDURE — 6360000002 HC RX W HCPCS: Performed by: HOSPITALIST

## 2023-08-22 PROCEDURE — 80053 COMPREHEN METABOLIC PANEL: CPT

## 2023-08-22 PROCEDURE — 6360000002 HC RX W HCPCS: Performed by: INTERNAL MEDICINE

## 2023-08-22 PROCEDURE — 36415 COLL VENOUS BLD VENIPUNCTURE: CPT

## 2023-08-22 PROCEDURE — 6370000000 HC RX 637 (ALT 250 FOR IP): Performed by: INTERNAL MEDICINE

## 2023-08-22 PROCEDURE — 2580000003 HC RX 258: Performed by: HOSPITALIST

## 2023-08-22 PROCEDURE — 6370000000 HC RX 637 (ALT 250 FOR IP): Performed by: HOSPITALIST

## 2023-08-22 PROCEDURE — 85025 COMPLETE CBC W/AUTO DIFF WBC: CPT

## 2023-08-22 PROCEDURE — 2580000003 HC RX 258: Performed by: INTERNAL MEDICINE

## 2023-08-22 RX ORDER — TAMSULOSIN HYDROCHLORIDE 0.4 MG/1
0.4 CAPSULE ORAL DAILY
Qty: 30 CAPSULE | Refills: 3 | Status: SHIPPED | OUTPATIENT
Start: 2023-08-22

## 2023-08-22 RX ADMIN — MEROPENEM 1000 MG: 1 INJECTION, POWDER, FOR SOLUTION INTRAVENOUS at 17:16

## 2023-08-22 RX ADMIN — MEROPENEM 1000 MG: 1 INJECTION, POWDER, FOR SOLUTION INTRAVENOUS at 02:16

## 2023-08-22 RX ADMIN — CETIRIZINE HYDROCHLORIDE 10 MG: 10 TABLET, FILM COATED ORAL at 17:16

## 2023-08-22 RX ADMIN — ENOXAPARIN SODIUM 30 MG: 100 INJECTION SUBCUTANEOUS at 09:35

## 2023-08-22 RX ADMIN — TAMSULOSIN HYDROCHLORIDE 0.4 MG: 0.4 CAPSULE ORAL at 09:35

## 2023-08-22 RX ADMIN — ROSUVASTATIN CALCIUM 40 MG: 40 TABLET, FILM COATED ORAL at 17:16

## 2023-08-22 RX ADMIN — SODIUM CHLORIDE, PRESERVATIVE FREE 10 ML: 5 INJECTION INTRAVENOUS at 09:36

## 2023-08-22 RX ADMIN — MEROPENEM 1000 MG: 1 INJECTION, POWDER, FOR SOLUTION INTRAVENOUS at 09:36

## 2023-08-22 RX ADMIN — MONTELUKAST 10 MG: 10 TABLET, FILM COATED ORAL at 17:16

## 2023-08-22 ASSESSMENT — PAIN SCALES - GENERAL
PAINLEVEL_OUTOF10: 0

## 2023-08-22 NOTE — PROGRESS NOTES
Bedside shift change report given to dante cuevas(oncoming nurse) by Roosevelt Longo (offgoing nurse). Report included the following information Nurse Handoff Report.

## 2023-08-22 NOTE — CARE COORDINATION
JOSE ALEJANDRO was informed that patient may be discharging today. JOSE ALEJANDRO called [employer] representative [address] [place] [address] at [phone], she stated she will look and verify that they have received the patients referral.  JOSE ALEJANDRO informed her that patient will need teaching prior to discharge from the hospital.  JOSE ALEJANDRO is awaiting telephone call to confirm from [address]. JOSE ALEJANDRO called [facility] at [phone] and spoke with Leta. She verified that she had received the patients referral and can accept the patient. Patient will need a home health order with specific disciplines for [facility] to start care. JOSE ALEJANDRO called [phone] and notified primary nurse.

## 2023-08-22 NOTE — PLAN OF CARE
Problem: Genitourinary - Adult  Goal: Absence of urinary retention  Outcome: Completed     Problem: Infection - Adult  Goal: Absence of infection at discharge  Outcome: Completed     Problem: Pain  Goal: Verbalizes/displays adequate comfort level or baseline comfort level  Outcome: Completed     Problem: Safety - Adult  Goal: Free from fall injury  Outcome: Completed

## 2023-08-22 NOTE — CARE COORDINATION
JOSE ALEJANDRO received telephone call from [address] representative. They need Clinical notes, labs and central line report. CM faxed clinical notes and labs to [address] at [phone]. JOSE ALEJANDRO does not have access to central line report. JOSE ALEJANDRO called primary nurse Nicole Stanley [phone] and asked her to fax report to [phone]. Nidia De Paz accepted and is going to fax report to [employer]. JOSE ALEJANDRO called [employer] representative back and informed her of above. Patient will be taught virtually by [employer] representative. JOSE ALEJANDRO called patient at [phone] and informed him of the accepting home health agency and infusion company. Patient verbalized understanding and is awaiting a telephone call from infusion company representative for teaching session.

## 2023-08-22 NOTE — CARE COORDINATION
Patient is cleared for discharge from CM standpoint. Patient has been educated to infuse IV antibiotics by Sheila Clancy notified of patients discharge and they will see patient tomorrow.

## 2023-08-22 NOTE — PROGRESS NOTES
Pick line is dry and intact. No redness ,swelling noted. Due med's given,flushed and covered with saline lock. No fever,vomiting and any discomfort during the shift. Verbalized exited to go home but needs, teaching on how to maintain pick line at home. Patient is not ready for teaching tonight,prefers to sleep. For morning nurse to perform discharge teaching and instructions upon discharge.

## 2023-08-22 NOTE — DISCHARGE SUMMARY
8/28/2023  11:30    Lakisha Bose MD  [address]  [phone]    Follow up in 1 week(s)      Spent 35 minutes evaluting and coordinating patient care and dc home with home health       Signed:  Durga Lindsay MD  8/22/2023  4:12 PM

## 2023-08-22 NOTE — PROGRESS NOTES
Discharge instructions given pt. Verbalized understanding pt.  Educated on picc line verbalized understanding and also got instructions virtual.

## 2023-08-24 ENCOUNTER — TELEPHONE (OUTPATIENT)
Age: 53
End: 2023-08-24

## 2023-08-24 LAB
BACTERIA SPEC CULT: NORMAL
BACTERIA SPEC CULT: NORMAL
Lab: NORMAL
Lab: NORMAL

## 2023-08-24 NOTE — TELEPHONE ENCOUNTER
Pt's home health nurse called in wanting to know if she needed to draw labs on Monday for pt. She stated that she did not have orders and wanted to make sure.  Ximena- home health nurse can be reached at [phone]

## 2023-08-25 LAB
BACTERIA SPEC CULT: NORMAL
Lab: NORMAL

## 2023-08-28 ENCOUNTER — HOSPITAL ENCOUNTER (OUTPATIENT)
Facility: HOSPITAL | Age: 53
Discharge: HOME | End: 2023-08-31
Payer: COMMERCIAL

## 2023-08-28 LAB
BASOPHILS # BLD: 0 K/UL (ref 0–0.1)
BASOPHILS NFR BLD: 0 % (ref 0–1)
CRP SERPL-MCNC: 0.38 MG/DL (ref 0–0.6)
DIFFERENTIAL METHOD BLD: ABNORMAL
EOSINOPHIL # BLD: 0.2 K/UL (ref 0–0.4)
EOSINOPHIL NFR BLD: 2 % (ref 0–7)
ERYTHROCYTE [DISTWIDTH] IN BLOOD BY AUTOMATED COUNT: 14.6 % (ref 11.5–14.5)
HCT VFR BLD AUTO: 38 % (ref 36.6–50.3)
HGB BLD-MCNC: 12.7 G/DL (ref 12.1–17)
IMM GRANULOCYTES # BLD AUTO: 0 K/UL (ref 0–0.04)
IMM GRANULOCYTES NFR BLD AUTO: 0 % (ref 0–0.5)
LYMPHOCYTES # BLD: 1.6 K/UL (ref 0.8–3.5)
LYMPHOCYTES NFR BLD: 19 % (ref 12–49)
MCH RBC QN AUTO: 28.5 PG (ref 26–34)
MCHC RBC AUTO-ENTMCNC: 33.4 G/DL (ref 30–36.5)
MCV RBC AUTO: 85.4 FL (ref 80–99)
MONOCYTES # BLD: 0.3 K/UL (ref 0–1)
MONOCYTES NFR BLD: 4 % (ref 5–13)
NEUTS SEG # BLD: 6.2 K/UL (ref 1.8–8)
NEUTS SEG NFR BLD: 75 % (ref 32–75)
NRBC # BLD: 0 K/UL (ref 0–0.01)
NRBC BLD-RTO: 0 PER 100 WBC
PLATELET # BLD AUTO: 285 K/UL (ref 150–400)
PMV BLD AUTO: 10.7 FL (ref 8.9–12.9)
RBC # BLD AUTO: 4.45 M/UL (ref 4.1–5.7)
WBC # BLD AUTO: 8.3 K/UL (ref 4.1–11.1)

## 2023-08-28 PROCEDURE — 86140 C-REACTIVE PROTEIN: CPT

## 2023-08-28 PROCEDURE — 85025 COMPLETE CBC W/AUTO DIFF WBC: CPT

## 2023-09-01 LAB
APPEARANCE UR: CLEAR
BACTERIA URNS QL MICRO: NEGATIVE /HPF
BILIRUB UR QL: NEGATIVE
COLOR UR: ABNORMAL
GLUCOSE UR STRIP.AUTO-MCNC: NEGATIVE MG/DL
HGB UR QL STRIP: NEGATIVE
KETONES UR QL STRIP.AUTO: ABNORMAL MG/DL
LEUKOCYTE ESTERASE UR QL STRIP.AUTO: NEGATIVE
NITRITE UR QL STRIP.AUTO: NEGATIVE
PH UR STRIP: 6.5 (ref 5–8)
PROT UR STRIP-MCNC: NEGATIVE MG/DL
RBC #/AREA URNS HPF: ABNORMAL /HPF (ref 0–3)
SP GR UR REFRACTOMETRY: 1.02 (ref 1–1.03)
UROBILINOGEN UR QL STRIP.AUTO: 0.2 EU/DL (ref 0.2–1)
WBC URNS QL MICRO: ABNORMAL /HPF (ref 0–5)

## 2023-09-02 LAB
BACTERIA SPEC CULT: NORMAL
Lab: NORMAL

## 2023-09-05 ENCOUNTER — TELEPHONE (OUTPATIENT)
Age: 53
End: 2023-09-05

## 2023-09-05 NOTE — TELEPHONE ENCOUNTER
Would like to know if the patient PICC LINE can be removed.  His urine cultures should be back, for review

## 2023-09-11 ENCOUNTER — OFFICE VISIT (OUTPATIENT)
Age: 53
End: 2023-09-11
Payer: COMMERCIAL

## 2023-09-11 VITALS
HEIGHT: 78 IN | WEIGHT: 247.6 LBS | OXYGEN SATURATION: 95 % | SYSTOLIC BLOOD PRESSURE: 91 MMHG | TEMPERATURE: 98.2 F | HEART RATE: 63 BPM | DIASTOLIC BLOOD PRESSURE: 61 MMHG | BODY MASS INDEX: 28.65 KG/M2 | RESPIRATION RATE: 18 BRPM

## 2023-09-11 DIAGNOSIS — N39.0 COMPLICATED UTI (URINARY TRACT INFECTION): Primary | ICD-10-CM

## 2023-09-11 PROCEDURE — 99204 OFFICE O/P NEW MOD 45 MIN: CPT | Performed by: INTERNAL MEDICINE

## 2023-09-11 NOTE — PROGRESS NOTES
Pharynx: Oropharynx is clear. Eyes:      Extraocular Movements: Extraocular movements intact. Pupils: Pupils are equal, round, and reactive to light. Cardiovascular:      Rate and Rhythm: Normal rate and regular rhythm. Heart sounds: No murmur heard. Pulmonary:      Effort: Pulmonary effort is normal. No respiratory distress. Breath sounds: Normal breath sounds. Abdominal:      General: Bowel sounds are normal.      Palpations: Abdomen is soft. Tenderness: There is no right CVA tenderness or left CVA tenderness. Genitourinary:     Comments: No Leone  Musculoskeletal:      Right lower leg: No edema. Left lower leg: No edema. Skin:     Findings: No rash. Neurological:      General: No focal deficit present. Mental Status: He is alert and oriented to person, place, and time. Psychiatric:         Mood and Affect: Mood normal.         Behavior: Behavior normal.         Thought Content: Thought content normal.         Judgment: Judgment normal.       CT Abdomen/Pelvis (8/18/23)  IMPRESSION:     1. Heterogeneous enhancement is shown posterolaterally in the interpolar region  of the right kidney concerning for pyelonephritis. Small bilateral renal cysts  and several small bilateral nonobstructing renal calculi again shown. 2. Prostatomegaly appears slightly less than that shown previously.   3. Splenomegaly again demonstrated      Urine culture (6/ 26/2023)   Klebsiella pneumoniae ** (EXTENDED SPECTRUM BETA LACTAMASE ) ** Abnormal   [facility] at      Antibiotic Interpretation Microscan    amikacin Sensitive <=2 ug/mL   ampicillin Resistant >=32 ug/mL   ampicillin-sulbactam Resistant >=32 ug/mL   ceFAZolin Resistant >=64 ug/mL   cefepime Resistant 2 ug/mL   cefTAZidime Resistant 16 ug/mL   cefTRIAXone Resistant >=64 ug/mL   ciprofloxacin Resistant >=4 ug/mL   gentamicin Resistant >=16 ug/mL   levofloxacin Resistant >=8 ug/mL   meropenem Sensitive <=0.25 ug/mL

## 2023-09-17 ASSESSMENT — ENCOUNTER SYMPTOMS
EYES NEGATIVE: 1
ALLERGIC/IMMUNOLOGIC NEGATIVE: 1
GASTROINTESTINAL NEGATIVE: 1
RESPIRATORY NEGATIVE: 1

## 2023-09-26 ENCOUNTER — TRANSCRIBE ORDERS (OUTPATIENT)
Facility: HOSPITAL | Age: 53
End: 2023-09-26

## 2023-09-26 ENCOUNTER — HOSPITAL ENCOUNTER (OUTPATIENT)
Facility: HOSPITAL | Age: 53
Discharge: HOME | End: 2023-09-29
Payer: COMMERCIAL

## 2023-09-26 DIAGNOSIS — N20.0 KIDNEY STONE: ICD-10-CM

## 2023-09-26 DIAGNOSIS — N20.0 KIDNEY STONE: Primary | ICD-10-CM

## 2023-09-26 PROCEDURE — 74018 RADEX ABDOMEN 1 VIEW: CPT

## 2023-10-25 ENCOUNTER — APPOINTMENT (OUTPATIENT)
Facility: HOSPITAL | Age: 53
End: 2023-10-25
Payer: COMMERCIAL

## 2023-10-25 ENCOUNTER — HOSPITAL ENCOUNTER (EMERGENCY)
Facility: HOSPITAL | Age: 53
Discharge: ANOTHER HOSPITAL | End: 2023-10-25
Attending: EMERGENCY MEDICINE
Payer: COMMERCIAL

## 2023-10-25 ENCOUNTER — HOSPITAL ENCOUNTER (INPATIENT)
Facility: HOSPITAL | Age: 53
LOS: 2 days | Discharge: HOME | DRG: 389 | End: 2023-10-28
Attending: STUDENT IN AN ORGANIZED HEALTH CARE EDUCATION/TRAINING PROGRAM | Admitting: SURGERY
Payer: COMMERCIAL

## 2023-10-25 VITALS
HEART RATE: 58 BPM | OXYGEN SATURATION: 99 % | SYSTOLIC BLOOD PRESSURE: 119 MMHG | DIASTOLIC BLOOD PRESSURE: 68 MMHG | WEIGHT: 246 LBS | RESPIRATION RATE: 17 BRPM | TEMPERATURE: 97.5 F | BODY MASS INDEX: 28.43 KG/M2

## 2023-10-25 DIAGNOSIS — N17.9 AKI (ACUTE KIDNEY INJURY) (HCC): ICD-10-CM

## 2023-10-25 DIAGNOSIS — K56.609 SBO (SMALL BOWEL OBSTRUCTION) (HCC): Primary | ICD-10-CM

## 2023-10-25 DIAGNOSIS — N30.00 ACUTE CYSTITIS WITHOUT HEMATURIA: ICD-10-CM

## 2023-10-25 LAB
ALBUMIN SERPL-MCNC: 4.3 G/DL (ref 3.5–5)
ALBUMIN/GLOB SERPL: 1 (ref 1.1–2.2)
ALP SERPL-CCNC: 92 U/L (ref 45–117)
ALT SERPL-CCNC: 42 U/L (ref 12–78)
ANION GAP SERPL CALC-SCNC: 15 MMOL/L (ref 5–15)
APPEARANCE UR: CLEAR
AST SERPL W P-5'-P-CCNC: 25 U/L (ref 15–37)
BACTERIA URNS QL MICRO: ABNORMAL /HPF
BASOPHILS # BLD: 0 K/UL (ref 0–0.1)
BASOPHILS NFR BLD: 0 % (ref 0–1)
BILIRUB SERPL-MCNC: 0.7 MG/DL (ref 0.2–1)
BILIRUB UR QL: NEGATIVE
BUN SERPL-MCNC: 21 MG/DL (ref 6–20)
BUN/CREAT SERPL: 15 (ref 12–20)
CA-I BLD-MCNC: 9.9 MG/DL (ref 8.5–10.1)
CHLORIDE SERPL-SCNC: 96 MMOL/L (ref 97–108)
CO2 SERPL-SCNC: 25 MMOL/L (ref 21–32)
COLOR UR: ABNORMAL
CREAT SERPL-MCNC: 1.38 MG/DL (ref 0.7–1.3)
DIFFERENTIAL METHOD BLD: ABNORMAL
EOSINOPHIL # BLD: 0.1 K/UL (ref 0–0.4)
EOSINOPHIL NFR BLD: 0 % (ref 0–7)
ERYTHROCYTE [DISTWIDTH] IN BLOOD BY AUTOMATED COUNT: 14.5 % (ref 11.5–14.5)
GLOBULIN SER CALC-MCNC: 4.1 G/DL (ref 2–4)
GLUCOSE BLD STRIP.AUTO-MCNC: 131 MG/DL (ref 65–100)
GLUCOSE SERPL-MCNC: 144 MG/DL (ref 65–100)
GLUCOSE UR STRIP.AUTO-MCNC: NEGATIVE MG/DL
HCT VFR BLD AUTO: 46.9 % (ref 36.6–50.3)
HGB BLD-MCNC: 16.1 G/DL (ref 12.1–17)
HGB UR QL STRIP: ABNORMAL
IMM GRANULOCYTES # BLD AUTO: 0.1 K/UL (ref 0–0.04)
IMM GRANULOCYTES NFR BLD AUTO: 0 % (ref 0–0.5)
KETONES UR QL STRIP.AUTO: NEGATIVE MG/DL
LACTATE SERPL-SCNC: 1.8 MMOL/L (ref 0.4–2)
LEUKOCYTE ESTERASE UR QL STRIP.AUTO: ABNORMAL
LIPASE SERPL-CCNC: 30 U/L (ref 13–75)
LYMPHOCYTES # BLD: 1.2 K/UL (ref 0.8–3.5)
LYMPHOCYTES NFR BLD: 11 % (ref 12–49)
MCH RBC QN AUTO: 29 PG (ref 26–34)
MCHC RBC AUTO-ENTMCNC: 34.3 G/DL (ref 30–36.5)
MCV RBC AUTO: 84.5 FL (ref 80–99)
MONOCYTES # BLD: 0.5 K/UL (ref 0–1)
MONOCYTES NFR BLD: 5 % (ref 5–13)
NEUTS SEG # BLD: 9.4 K/UL (ref 1.8–8)
NEUTS SEG NFR BLD: 84 % (ref 32–75)
NITRITE UR QL STRIP.AUTO: POSITIVE
NRBC # BLD: 0 K/UL (ref 0–0.01)
NRBC BLD-RTO: 0 PER 100 WBC
PERFORMED BY:: ABNORMAL
PH UR STRIP: 6 (ref 5–8)
PLATELET # BLD AUTO: 219 K/UL (ref 150–400)
PMV BLD AUTO: 10.5 FL (ref 8.9–12.9)
POTASSIUM SERPL-SCNC: 3.7 MMOL/L (ref 3.5–5.1)
PROT SERPL-MCNC: 8.4 G/DL (ref 6.4–8.2)
PROT UR STRIP-MCNC: NEGATIVE MG/DL
RBC # BLD AUTO: 5.55 M/UL (ref 4.1–5.7)
RBC #/AREA URNS HPF: ABNORMAL /HPF (ref 0–3)
SODIUM SERPL-SCNC: 136 MMOL/L (ref 136–145)
SP GR UR REFRACTOMETRY: 1.02 (ref 1–1.03)
UROBILINOGEN UR QL STRIP.AUTO: 0.2 EU/DL (ref 0.2–1)
WBC # BLD AUTO: 11.3 K/UL (ref 4.1–11.1)
WBC URNS QL MICRO: ABNORMAL /HPF (ref 0–5)

## 2023-10-25 PROCEDURE — 0D9670Z DRAINAGE OF STOMACH WITH DRAINAGE DEVICE, VIA NATURAL OR ARTIFICIAL OPENING: ICD-10-PCS | Performed by: STUDENT IN AN ORGANIZED HEALTH CARE EDUCATION/TRAINING PROGRAM

## 2023-10-25 PROCEDURE — 6360000002 HC RX W HCPCS: Performed by: EMERGENCY MEDICINE

## 2023-10-25 PROCEDURE — 82962 GLUCOSE BLOOD TEST: CPT

## 2023-10-25 PROCEDURE — 81001 URINALYSIS AUTO W/SCOPE: CPT

## 2023-10-25 PROCEDURE — 6360000004 HC RX CONTRAST MEDICATION: Performed by: EMERGENCY MEDICINE

## 2023-10-25 PROCEDURE — 96374 THER/PROPH/DIAG INJ IV PUSH: CPT

## 2023-10-25 PROCEDURE — 87040 BLOOD CULTURE FOR BACTERIA: CPT

## 2023-10-25 PROCEDURE — 99285 EMERGENCY DEPT VISIT HI MDM: CPT

## 2023-10-25 PROCEDURE — 36415 COLL VENOUS BLD VENIPUNCTURE: CPT

## 2023-10-25 PROCEDURE — 96361 HYDRATE IV INFUSION ADD-ON: CPT

## 2023-10-25 PROCEDURE — 87150 DNA/RNA AMPLIFIED PROBE: CPT

## 2023-10-25 PROCEDURE — 93005 ELECTROCARDIOGRAM TRACING: CPT | Performed by: EMERGENCY MEDICINE

## 2023-10-25 PROCEDURE — 83690 ASSAY OF LIPASE: CPT

## 2023-10-25 PROCEDURE — 74018 RADEX ABDOMEN 1 VIEW: CPT

## 2023-10-25 PROCEDURE — 6370000000 HC RX 637 (ALT 250 FOR IP): Performed by: EMERGENCY MEDICINE

## 2023-10-25 PROCEDURE — 2580000003 HC RX 258: Performed by: EMERGENCY MEDICINE

## 2023-10-25 PROCEDURE — 96375 TX/PRO/DX INJ NEW DRUG ADDON: CPT

## 2023-10-25 PROCEDURE — 85025 COMPLETE CBC W/AUTO DIFF WBC: CPT

## 2023-10-25 PROCEDURE — 80053 COMPREHEN METABOLIC PANEL: CPT

## 2023-10-25 PROCEDURE — 71045 X-RAY EXAM CHEST 1 VIEW: CPT

## 2023-10-25 PROCEDURE — 74177 CT ABD & PELVIS W/CONTRAST: CPT

## 2023-10-25 PROCEDURE — 83605 ASSAY OF LACTIC ACID: CPT

## 2023-10-25 RX ORDER — 0.9 % SODIUM CHLORIDE 0.9 %
1000 INTRAVENOUS SOLUTION INTRAVENOUS ONCE
Status: COMPLETED | OUTPATIENT
Start: 2023-10-25 | End: 2023-10-26

## 2023-10-25 RX ORDER — ONDANSETRON 2 MG/ML
4 INJECTION INTRAMUSCULAR; INTRAVENOUS EVERY 4 HOURS PRN
Status: DISCONTINUED | OUTPATIENT
Start: 2023-10-25 | End: 2023-10-28 | Stop reason: HOSPADM

## 2023-10-25 RX ORDER — MORPHINE SULFATE 4 MG/ML
4 INJECTION, SOLUTION INTRAMUSCULAR; INTRAVENOUS EVERY 4 HOURS PRN
Status: DISCONTINUED | OUTPATIENT
Start: 2023-10-25 | End: 2023-10-26

## 2023-10-25 RX ORDER — KETOROLAC TROMETHAMINE 15 MG/ML
15 INJECTION, SOLUTION INTRAMUSCULAR; INTRAVENOUS ONCE
Status: COMPLETED | OUTPATIENT
Start: 2023-10-25 | End: 2023-10-25

## 2023-10-25 RX ORDER — 0.9 % SODIUM CHLORIDE 0.9 %
1000 INTRAVENOUS SOLUTION INTRAVENOUS ONCE
Status: COMPLETED | OUTPATIENT
Start: 2023-10-25 | End: 2023-10-25

## 2023-10-25 RX ORDER — DICYCLOMINE HYDROCHLORIDE 10 MG/1
20 CAPSULE ORAL ONCE
Status: COMPLETED | OUTPATIENT
Start: 2023-10-25 | End: 2023-10-25

## 2023-10-25 RX ORDER — MORPHINE SULFATE 4 MG/ML
4 INJECTION, SOLUTION INTRAMUSCULAR; INTRAVENOUS
Status: COMPLETED | OUTPATIENT
Start: 2023-10-25 | End: 2023-10-25

## 2023-10-25 RX ORDER — ONDANSETRON 2 MG/ML
4 INJECTION INTRAMUSCULAR; INTRAVENOUS ONCE
Status: COMPLETED | OUTPATIENT
Start: 2023-10-25 | End: 2023-10-25

## 2023-10-25 RX ORDER — SODIUM CHLORIDE 9 MG/ML
125 INJECTION, SOLUTION INTRAVENOUS ONCE
Status: COMPLETED | OUTPATIENT
Start: 2023-10-25 | End: 2023-10-26

## 2023-10-25 RX ADMIN — ONDANSETRON 4 MG: 2 INJECTION INTRAMUSCULAR; INTRAVENOUS at 17:30

## 2023-10-25 RX ADMIN — SODIUM CHLORIDE 1000 ML: 9 INJECTION, SOLUTION INTRAVENOUS at 17:29

## 2023-10-25 RX ADMIN — MORPHINE SULFATE 4 MG: 4 INJECTION, SOLUTION INTRAMUSCULAR; INTRAVENOUS at 22:25

## 2023-10-25 RX ADMIN — IOPAMIDOL 100 ML: 755 INJECTION, SOLUTION INTRAVENOUS at 18:19

## 2023-10-25 RX ADMIN — SODIUM CHLORIDE 1000 ML: 9 INJECTION, SOLUTION INTRAVENOUS at 21:34

## 2023-10-25 RX ADMIN — WATER 1000 MG: 1 INJECTION INTRAMUSCULAR; INTRAVENOUS; SUBCUTANEOUS at 21:33

## 2023-10-25 RX ADMIN — DICYCLOMINE HYDROCHLORIDE 20 MG: 10 CAPSULE ORAL at 17:29

## 2023-10-25 RX ADMIN — KETOROLAC TROMETHAMINE 15 MG: 15 INJECTION, SOLUTION INTRAMUSCULAR; INTRAVENOUS at 17:30

## 2023-10-25 ASSESSMENT — PAIN - FUNCTIONAL ASSESSMENT
PAIN_FUNCTIONAL_ASSESSMENT: 0-10
PAIN_FUNCTIONAL_ASSESSMENT: 0-10

## 2023-10-25 ASSESSMENT — LIFESTYLE VARIABLES
HOW OFTEN DO YOU HAVE A DRINK CONTAINING ALCOHOL: NEVER
HOW MANY STANDARD DRINKS CONTAINING ALCOHOL DO YOU HAVE ON A TYPICAL DAY: PATIENT DOES NOT DRINK

## 2023-10-25 ASSESSMENT — PAIN DESCRIPTION - ORIENTATION: ORIENTATION: LOWER;LEFT

## 2023-10-25 ASSESSMENT — PAIN SCALES - GENERAL
PAINLEVEL_OUTOF10: 0
PAINLEVEL_OUTOF10: 7
PAINLEVEL_OUTOF10: 10

## 2023-10-25 ASSESSMENT — PAIN DESCRIPTION - LOCATION: LOCATION: ABDOMEN

## 2023-10-25 NOTE — ED NOTES
18fr NG tube inserted at 65. Large amounts of thick light brownish in color vomitus return. Patient vomited x2 immediately after insertion. Patient verbalized he was ok after vomited.       Juan M Liang RN  10/25/23 4701

## 2023-10-25 NOTE — ED PROVIDER NOTES
1,000 mg at 10/25/23 2133    sodium chloride 0.9 % bolus 1,000 mL  1,000 mL IntraVENous Once Shweta Santana MD 1,935.5 mL/hr at 10/25/23 2134 1,000 mL at 10/25/23 2134     Current Outpatient Medications   Medication Sig Dispense Refill    tamsulosin (FLOMAX) 0.4 MG capsule Take 1 capsule by mouth daily 30 capsule 3    glucose monitoring (FREESTYLE FREEDOM) kit 1 kit by Does not apply route daily 1 kit 0    blood glucose monitor strips Test 2 times a day & as needed for symptoms of irregular blood glucose. Dispense sufficient amount for indicated testing frequency plus additional to accommodate PRN testing needs. 60 strip 2    Lancets MISC 1 each by Does not apply route 2 times daily 300 each 1    metFORMIN (GLUCOPHAGE) 500 MG tablet Take 1 tablet by mouth 2 times daily (with meals) 180 tablet 1    traMADol (ULTRAM) 50 MG tablet Take 1 tablet by mouth 2 times daily.  Pain Max Daily Amount: 100 mg (Patient not taking: Reported on 9/11/2023)      hydrocortisone (ANUSOL-HC) 25 MG suppository INSERT 1 SUPPOSITORY RECTALLY EVERY DAY AT BEDTIME (Patient not taking: Reported on 9/11/2023)      montelukast (SINGULAIR) 10 MG tablet Take 1 tablet by mouth daily         Social Determinants of Health:   Social Determinants of Health     Tobacco Use: Low Risk  (10/25/2023)    Patient History     Smoking Tobacco Use: Never     Smokeless Tobacco Use: Never     Passive Exposure: Not on file   Alcohol Use: Not At Risk (6/26/2023)    AUDIT-C     Frequency of Alcohol Consumption: Never     Average Number of Drinks: Patient does not drink     Frequency of Binge Drinking: Never   Financial Resource Strain: Not on file   Food Insecurity: Not on file   Transportation Needs: Not on file   Physical Activity: Not on file   Stress: Not on file   Social Connections: Not on file   Intimate Partner Violence: Not on file   Depression: Not on file   Housing Stability: Not on file       PHYSICAL EXAM   Physical Exam  Vitals and nursing note

## 2023-10-26 PROBLEM — K56.609 SBO (SMALL BOWEL OBSTRUCTION) (HCC): Status: ACTIVE | Noted: 2023-10-26

## 2023-10-26 LAB
ABO + RH BLD: NORMAL
ANION GAP SERPL CALC-SCNC: 8 MMOL/L (ref 5–15)
BASOPHILS # BLD: 0 K/UL (ref 0–0.1)
BASOPHILS NFR BLD: 0 % (ref 0–1)
BLOOD GROUP ANTIBODIES SERPL: NEGATIVE
BUN SERPL-MCNC: 20 MG/DL (ref 6–20)
BUN/CREAT SERPL: 16 (ref 12–20)
CA-I BLD-MCNC: 8.6 MG/DL (ref 8.5–10.1)
CHLORIDE SERPL-SCNC: 103 MMOL/L (ref 97–108)
CO2 SERPL-SCNC: 27 MMOL/L (ref 21–32)
CREAT SERPL-MCNC: 1.24 MG/DL (ref 0.7–1.3)
DIFFERENTIAL METHOD BLD: ABNORMAL
EOSINOPHIL # BLD: 0 K/UL (ref 0–0.4)
EOSINOPHIL NFR BLD: 0 % (ref 0–7)
ERYTHROCYTE [DISTWIDTH] IN BLOOD BY AUTOMATED COUNT: 14.4 % (ref 11.5–14.5)
GLUCOSE BLD STRIP.AUTO-MCNC: 116 MG/DL (ref 65–100)
GLUCOSE SERPL-MCNC: 117 MG/DL (ref 65–100)
HCT VFR BLD AUTO: 41.6 % (ref 36.6–50.3)
HGB BLD-MCNC: 14.4 G/DL (ref 12.1–17)
IMM GRANULOCYTES # BLD AUTO: 0 K/UL (ref 0–0.04)
IMM GRANULOCYTES NFR BLD AUTO: 0 % (ref 0–0.5)
INR PPP: 1 (ref 0.9–1.1)
LYMPHOCYTES # BLD: 0.6 K/UL (ref 0.8–3.5)
LYMPHOCYTES NFR BLD: 7 % (ref 12–49)
MCH RBC QN AUTO: 29.2 PG (ref 26–34)
MCHC RBC AUTO-ENTMCNC: 34.6 G/DL (ref 30–36.5)
MCV RBC AUTO: 84.4 FL (ref 80–99)
MONOCYTES # BLD: 0.3 K/UL (ref 0–1)
MONOCYTES NFR BLD: 4 % (ref 5–13)
NEUTS SEG # BLD: 8.5 K/UL (ref 1.8–8)
NEUTS SEG NFR BLD: 89 % (ref 32–75)
NRBC # BLD: 0 K/UL (ref 0–0.01)
NRBC BLD-RTO: 0 PER 100 WBC
PERFORMED BY:: ABNORMAL
PLATELET # BLD AUTO: 187 K/UL (ref 150–400)
PMV BLD AUTO: 11 FL (ref 8.9–12.9)
POTASSIUM SERPL-SCNC: 3.8 MMOL/L (ref 3.5–5.1)
PROTHROMBIN TIME: 13.8 SEC (ref 11.9–14.6)
RBC # BLD AUTO: 4.93 M/UL (ref 4.1–5.7)
SODIUM SERPL-SCNC: 138 MMOL/L (ref 136–145)
SPECIMEN EXP DATE BLD: NORMAL
WBC # BLD AUTO: 9.5 K/UL (ref 4.1–11.1)

## 2023-10-26 PROCEDURE — 36415 COLL VENOUS BLD VENIPUNCTURE: CPT

## 2023-10-26 PROCEDURE — 99222 1ST HOSP IP/OBS MODERATE 55: CPT | Performed by: SURGERY

## 2023-10-26 PROCEDURE — 2500000003 HC RX 250 WO HCPCS: Performed by: SURGERY

## 2023-10-26 PROCEDURE — 85025 COMPLETE CBC W/AUTO DIFF WBC: CPT

## 2023-10-26 PROCEDURE — 6360000002 HC RX W HCPCS: Performed by: SURGERY

## 2023-10-26 PROCEDURE — 1100000000 HC RM PRIVATE

## 2023-10-26 PROCEDURE — 85610 PROTHROMBIN TIME: CPT

## 2023-10-26 PROCEDURE — 86850 RBC ANTIBODY SCREEN: CPT

## 2023-10-26 PROCEDURE — 2580000003 HC RX 258: Performed by: SURGERY

## 2023-10-26 PROCEDURE — 80048 BASIC METABOLIC PNL TOTAL CA: CPT

## 2023-10-26 PROCEDURE — 86900 BLOOD TYPING SEROLOGIC ABO: CPT

## 2023-10-26 PROCEDURE — 86901 BLOOD TYPING SEROLOGIC RH(D): CPT

## 2023-10-26 PROCEDURE — 82962 GLUCOSE BLOOD TEST: CPT

## 2023-10-26 PROCEDURE — 2580000003 HC RX 258: Performed by: STUDENT IN AN ORGANIZED HEALTH CARE EDUCATION/TRAINING PROGRAM

## 2023-10-26 RX ORDER — ACETAMINOPHEN 650 MG/1
650 SUPPOSITORY RECTAL EVERY 6 HOURS PRN
Status: DISCONTINUED | OUTPATIENT
Start: 2023-10-26 | End: 2023-10-28 | Stop reason: HOSPADM

## 2023-10-26 RX ORDER — SODIUM CHLORIDE 0.9 % (FLUSH) 0.9 %
5-40 SYRINGE (ML) INJECTION EVERY 12 HOURS SCHEDULED
Status: DISCONTINUED | OUTPATIENT
Start: 2023-10-26 | End: 2023-10-28 | Stop reason: HOSPADM

## 2023-10-26 RX ORDER — ENOXAPARIN SODIUM 100 MG/ML
30 INJECTION SUBCUTANEOUS 2 TIMES DAILY
Status: DISCONTINUED | OUTPATIENT
Start: 2023-10-26 | End: 2023-10-28 | Stop reason: HOSPADM

## 2023-10-26 RX ORDER — DEXTROSE MONOHYDRATE, SODIUM CHLORIDE, AND POTASSIUM CHLORIDE 50; 1.49; 4.5 G/1000ML; G/1000ML; G/1000ML
INJECTION, SOLUTION INTRAVENOUS CONTINUOUS
Status: DISCONTINUED | OUTPATIENT
Start: 2023-10-26 | End: 2023-10-28 | Stop reason: HOSPADM

## 2023-10-26 RX ORDER — ONDANSETRON 2 MG/ML
4 INJECTION INTRAMUSCULAR; INTRAVENOUS EVERY 6 HOURS PRN
Status: DISCONTINUED | OUTPATIENT
Start: 2023-10-26 | End: 2023-10-28 | Stop reason: HOSPADM

## 2023-10-26 RX ORDER — SODIUM CHLORIDE 0.9 % (FLUSH) 0.9 %
5-40 SYRINGE (ML) INJECTION PRN
Status: DISCONTINUED | OUTPATIENT
Start: 2023-10-26 | End: 2023-10-28 | Stop reason: HOSPADM

## 2023-10-26 RX ORDER — ONDANSETRON 4 MG/1
4 TABLET, ORALLY DISINTEGRATING ORAL EVERY 8 HOURS PRN
Status: DISCONTINUED | OUTPATIENT
Start: 2023-10-26 | End: 2023-10-28 | Stop reason: HOSPADM

## 2023-10-26 RX ORDER — SODIUM CHLORIDE 9 MG/ML
INJECTION, SOLUTION INTRAVENOUS PRN
Status: DISCONTINUED | OUTPATIENT
Start: 2023-10-26 | End: 2023-10-28 | Stop reason: HOSPADM

## 2023-10-26 RX ORDER — ACETAMINOPHEN 325 MG/1
650 TABLET ORAL EVERY 6 HOURS PRN
Status: DISCONTINUED | OUTPATIENT
Start: 2023-10-26 | End: 2023-10-28 | Stop reason: HOSPADM

## 2023-10-26 RX ADMIN — SODIUM CHLORIDE 125 ML/HR: 9 INJECTION, SOLUTION INTRAVENOUS at 03:55

## 2023-10-26 RX ADMIN — SODIUM CHLORIDE, PRESERVATIVE FREE 10 ML: 5 INJECTION INTRAVENOUS at 09:13

## 2023-10-26 RX ADMIN — ENOXAPARIN SODIUM 30 MG: 100 INJECTION SUBCUTANEOUS at 09:13

## 2023-10-26 RX ADMIN — POTASSIUM CHLORIDE, DEXTROSE MONOHYDRATE AND SODIUM CHLORIDE: 150; 5; 450 INJECTION, SOLUTION INTRAVENOUS at 14:11

## 2023-10-26 RX ADMIN — ENOXAPARIN SODIUM 30 MG: 100 INJECTION SUBCUTANEOUS at 19:53

## 2023-10-26 RX ADMIN — SODIUM CHLORIDE 1000 ML: 9 INJECTION, SOLUTION INTRAVENOUS at 00:35

## 2023-10-26 RX ADMIN — POTASSIUM CHLORIDE, DEXTROSE MONOHYDRATE AND SODIUM CHLORIDE: 150; 5; 450 INJECTION, SOLUTION INTRAVENOUS at 04:15

## 2023-10-26 NOTE — CARE COORDINATION
10/26/23 1318   Service Assessment   Patient Orientation Alert and Oriented   Cognition Alert   History Provided By Patient   Primary [address] Family Members   Patient's Healthcare Decision Maker is: Legal Next of [facility]   PCP Verified by CM Yes   Last Visit to PCP Within last 3 months   Prior Functional Level Independent in ADLs/IADLs   Current Functional Level Independent in ADLs/IADLs   Can patient return to prior living arrangement Yes   Ability to make needs known: Good   Family able to assist with home care needs: Yes   Would you like for me to discuss the discharge plan with any other family members/significant others, and if so, who? No     Cm met with patient at bedside to complete discharge assessment. Patient is completed independent at home and lives with his brother. Current disposition is home/self.

## 2023-10-26 NOTE — ED PROVIDER NOTES
[address] [address]  EMERGENCY DEPARTMENT ENCOUNTER NOTE    Date: 10/25/2023  Patient Name: Mark Palomares    History of Presenting Illness     Chief Complaint   Patient presents with    Care Transitions       History obtained from: Patient    HPI: Mark Palomares, 48 y.o. male with past medical history as listed and reviewed below presents for SBO. Patient presented to outside hospital for left abdominal pain that worsened over the past 24 hours. He did not have any nausea or vomiting, and did report some mild diarrhea. He did have abdominal tenderness in the left upper and lower quadrants. CT showed mild dilation of loops of small bowel suspicious for low-grade partial obstruction. An NG tube was placed and x-ray confirmed satisfactory position. Patient's vital signs are within normal limits with an erroneous value of 87/55 that was corrected to 120/60 at outside hospital.  Work-up showed a mild TANISHA, normal lipase, no significant leukocytosis or anemia, urinalysis concerning for UTI, and normal lactic acid not suggesting any gut wall necrosis, the patient received ceftriaxone for UTI. Medical History   I reviewed the medical, surgical, family, and social history, as well as allergies:    PCP: Jovon Monson MD    Past Medical History:  Past Medical History:   Diagnosis Date    Alopecia 9/18/2021    Diabetes mellitus ([address])     Epigastric pain 9/18/2021    Hemorrhage of anus and rectum 9/18/2021    Hemorrhoids 1/41/8046    Renal colic 1/66/1026     Past Surgical History:  History reviewed. No pertinent surgical history. Current Outpatient Medications:  Current Outpatient Medications   Medication Instructions    blood glucose monitor strips Test 2 times a day & as needed for symptoms of irregular blood glucose. Dispense sufficient amount for indicated testing frequency plus additional to accommodate PRN testing needs.     glucose monitoring (FREESTYLE FREEDOM) kit Moves U&LE to command    Medical Decision Making     Patient is a 48 y.o. male presenting for SBO. Vitals reveal no significant abnormalities and physical exam reveals  abd tenderness . Based on the history, physical exam, risk factors, and vital signs, differential includes: SBO. No concern for mesenteric ischemia or gut wall necrosis. Differentials include TANISHA, dehydration, and UTI. Jaren Harris See ED Course and Reassessment for evaluation and discussion. Records Reviewed: Nursing Notes, Old Medical Records, Previous Laboratory Studies, and Previous Radiology Studies  Social Determinants of health affecting management: None    ED Course & Reassessment     ED Course:          Reassessment:    Will admit. Diagnosis     Clinical Impression:   1. SBO (small bowel obstruction) ([address])    2. TANISHA (acute kidney injury) ([address])    3. Acute cystitis without hematuria        Final Disposition     Admission: [address] [place] [address]    After completion of ED workup and discussion of results and diagnoses, patient was admitted to the hospital. Case was discussed with admitting provider.      Results, Consults, Medications     Consults:  None   Labs:  Recent Results (from the past 12 hour(s))   POCT Glucose    Collection Time: 10/25/23  4:00 PM   Result Value Ref Range    POC Glucose 131 (H) 65 - 100 mg/dL    Performed by: Derick Nichols    EKG 12 Lead (Select if Upper Abd Pain, or SOB, Diaphoresis or Tachy)    Collection Time: 10/25/23  4:01 PM   Result Value Ref Range    Ventricular Rate 55 BPM    Atrial Rate 55 BPM    P-R Interval 146 ms    QRS Duration 107 ms    Q-T Interval 422 ms    QTc Calculation (Bazett) 404 ms    P Axis 5 degrees    R Axis 81 degrees    T Axis 74 degrees    Diagnosis       Sinus rhythm  Nonspecific T abnrm, anterolateral leads     CBC with Diff    Collection Time: 10/25/23  4:15 PM   Result Value Ref Range    WBC 11.3 (H) 4.1 - 11.1 K/uL    RBC 5.55 4.10 - 5.70 M/uL    Hemoglobin 16.1 12.1 - 17.0 g/dL

## 2023-10-26 NOTE — ED TRIAGE NOTES
Pt transfer for GI consult due to SBO. Pt presents with 18G NG tube in place.  Appears in no acute distress noted upon arrival.

## 2023-10-26 NOTE — ED NOTES
Transfer center called for report will call back with eta for transfer.       Jag Liang RN  10/25/23 7545

## 2023-10-26 NOTE — ED NOTES
[facility] Shagufta, Charge Nurse.       Jose Liang RN  10/25/23 6717

## 2023-10-27 LAB
ACCESSION NUMBER, LLC1M: ABNORMAL
ACINETOBACTER CALCOAC BAUMANNII COMPLEX BY PCR: NOT DETECTED
ALBUMIN SERPL-MCNC: 3.5 G/DL (ref 3.5–5)
ALBUMIN/GLOB SERPL: 0.9 (ref 1.1–2.2)
ALP SERPL-CCNC: 73 U/L (ref 45–117)
ALT SERPL-CCNC: 33 U/L (ref 12–78)
ANION GAP SERPL CALC-SCNC: 5 MMOL/L (ref 5–15)
AST SERPL W P-5'-P-CCNC: 11 U/L (ref 15–37)
BACTEROIDES FRAGILIS BY PCR: NOT DETECTED
BASOPHILS # BLD: 0 K/UL (ref 0–0.1)
BASOPHILS NFR BLD: 0 % (ref 0–1)
BILIRUB SERPL-MCNC: 0.5 MG/DL (ref 0.2–1)
BIOFIRE TEST COMMENT: ABNORMAL
BUN SERPL-MCNC: 11 MG/DL (ref 6–20)
BUN/CREAT SERPL: 10 (ref 12–20)
CA-I BLD-MCNC: 8.9 MG/DL (ref 8.5–10.1)
CANDIDA ALBICANS BY PCR: NOT DETECTED
CANDIDA AURIS BY PCR: NOT DETECTED
CANDIDA GLABRATA: NOT DETECTED
CANDIDA KRUSEI BY PCR: NOT DETECTED
CANDIDA PARAPSILOSIS BY PCR: NOT DETECTED
CANDIDA TROPICALIS BY PCR: NOT DETECTED
CHLORIDE SERPL-SCNC: 110 MMOL/L (ref 97–108)
CO2 SERPL-SCNC: 27 MMOL/L (ref 21–32)
CREAT SERPL-MCNC: 1.13 MG/DL (ref 0.7–1.3)
CRYPTOCOCCUS NEOFORMANS/GATTII BY PCR: NOT DETECTED
DIFFERENTIAL METHOD BLD: NORMAL
EKG ATRIAL RATE: 55 BPM
EKG DIAGNOSIS: NORMAL
EKG P AXIS: 5 DEGREES
EKG P-R INTERVAL: 146 MS
EKG Q-T INTERVAL: 422 MS
EKG QRS DURATION: 107 MS
EKG QTC CALCULATION (BAZETT): 404 MS
EKG R AXIS: 81 DEGREES
EKG T AXIS: 74 DEGREES
EKG VENTRICULAR RATE: 55 BPM
ENTEROBACTER CLOACAE COMPLEX BY PCR: NOT DETECTED
ENTEROBACTERALES BY PCR: NOT DETECTED
ENTEROCOCCUS FAECALIS BY PCR: NOT DETECTED
ENTEROCOCCUS FAECIUM BY PCR: NOT DETECTED
EOSINOPHIL # BLD: 0.1 K/UL (ref 0–0.4)
EOSINOPHIL NFR BLD: 1 % (ref 0–7)
ERYTHROCYTE [DISTWIDTH] IN BLOOD BY AUTOMATED COUNT: 14.4 % (ref 11.5–14.5)
ESCHERICHIA COLI: NOT DETECTED
GLOBULIN SER CALC-MCNC: 3.9 G/DL (ref 2–4)
GLUCOSE SERPL-MCNC: 102 MG/DL (ref 65–100)
HAEMOPHILUS INFLUENZAE BY PCR: NOT DETECTED
HCT VFR BLD AUTO: 44.7 % (ref 36.6–50.3)
HGB BLD-MCNC: 15 G/DL (ref 12.1–17)
IMM GRANULOCYTES # BLD AUTO: 0 K/UL (ref 0–0.04)
IMM GRANULOCYTES NFR BLD AUTO: 0 % (ref 0–0.5)
KLEBSIELLA AEROGENES BY PCR: NOT DETECTED
KLEBSIELLA OXYTOCA BY PCR: NOT DETECTED
KLEBSIELLA PNEUMONIAE GROUP BY PCR: NOT DETECTED
LISTERIA MONOCYTOGENES BY PCR: NOT DETECTED
LYMPHOCYTES # BLD: 1.4 K/UL (ref 0.8–3.5)
LYMPHOCYTES NFR BLD: 20 % (ref 12–49)
MCH RBC QN AUTO: 28.6 PG (ref 26–34)
MCHC RBC AUTO-ENTMCNC: 33.6 G/DL (ref 30–36.5)
MCV RBC AUTO: 85.3 FL (ref 80–99)
METHICILLIN RESISTANCE MECA/C  BY PCR: DETECTED
MONOCYTES # BLD: 0.5 K/UL (ref 0–1)
MONOCYTES NFR BLD: 7 % (ref 5–13)
NEISSERIA MENINGITIDIS BY PCR: NOT DETECTED
NEUTS SEG # BLD: 4.9 K/UL (ref 1.8–8)
NEUTS SEG NFR BLD: 72 % (ref 32–75)
NRBC # BLD: 0 K/UL (ref 0–0.01)
NRBC BLD-RTO: 0 PER 100 WBC
PLATELET # BLD AUTO: 177 K/UL (ref 150–400)
PMV BLD AUTO: 10.6 FL (ref 8.9–12.9)
POTASSIUM SERPL-SCNC: 3.8 MMOL/L (ref 3.5–5.1)
PROT SERPL-MCNC: 7.4 G/DL (ref 6.4–8.2)
PROTEUS BY PCR: NOT DETECTED
PSEUDOMONAS AERUGINOSA, PSAEP: NOT DETECTED
RBC # BLD AUTO: 5.24 M/UL (ref 4.1–5.7)
RESISTANT GENE TARGETS: ABNORMAL
SALMONELLA SPECIES BY PCR: NOT DETECTED
SERRATIA MARCESCENS BY PCR: NOT DETECTED
SODIUM SERPL-SCNC: 142 MMOL/L (ref 136–145)
STAPHYLOCOCCUS AUREUS: NOT DETECTED
STAPHYLOCOCCUS EPIDERMIDIS BY PCR: DETECTED
STAPHYLOCOCCUS LUGDUNENSIS BY PCR: NOT DETECTED
STAPHYLOCOCCUS: DETECTED
STENOTROPHOMONAS MALTOPHILIA BY PCR: NOT DETECTED
STREPTOCOCCUS AGALACTIAE (GROUP B): NOT DETECTED
STREPTOCOCCUS PNEUMONIAE , SPNP: NOT DETECTED
STREPTOCOCCUS PYOGENES (GROUP A), SPYOP: NOT DETECTED
STREPTOCOCCUS: NOT DETECTED
WBC # BLD AUTO: 6.9 K/UL (ref 4.1–11.1)

## 2023-10-27 PROCEDURE — 6360000002 HC RX W HCPCS: Performed by: SURGERY

## 2023-10-27 PROCEDURE — 99232 SBSQ HOSP IP/OBS MODERATE 35: CPT | Performed by: SURGERY

## 2023-10-27 PROCEDURE — 1100000000 HC RM PRIVATE

## 2023-10-27 PROCEDURE — 2500000003 HC RX 250 WO HCPCS: Performed by: SURGERY

## 2023-10-27 PROCEDURE — 85025 COMPLETE CBC W/AUTO DIFF WBC: CPT

## 2023-10-27 PROCEDURE — 80053 COMPREHEN METABOLIC PANEL: CPT

## 2023-10-27 PROCEDURE — 36415 COLL VENOUS BLD VENIPUNCTURE: CPT

## 2023-10-27 PROCEDURE — 2580000003 HC RX 258: Performed by: SURGERY

## 2023-10-27 RX ADMIN — SODIUM CHLORIDE, PRESERVATIVE FREE 10 ML: 5 INJECTION INTRAVENOUS at 22:16

## 2023-10-27 RX ADMIN — ENOXAPARIN SODIUM 30 MG: 100 INJECTION SUBCUTANEOUS at 09:08

## 2023-10-27 RX ADMIN — POTASSIUM CHLORIDE, DEXTROSE MONOHYDRATE AND SODIUM CHLORIDE: 150; 5; 450 INJECTION, SOLUTION INTRAVENOUS at 00:13

## 2023-10-27 RX ADMIN — SODIUM CHLORIDE, PRESERVATIVE FREE 10 ML: 5 INJECTION INTRAVENOUS at 09:08

## 2023-10-27 RX ADMIN — ENOXAPARIN SODIUM 30 MG: 100 INJECTION SUBCUTANEOUS at 22:14

## 2023-10-27 RX ADMIN — POTASSIUM CHLORIDE, DEXTROSE MONOHYDRATE AND SODIUM CHLORIDE: 150; 5; 450 INJECTION, SOLUTION INTRAVENOUS at 09:15

## 2023-10-27 RX ADMIN — POTASSIUM CHLORIDE, DEXTROSE MONOHYDRATE AND SODIUM CHLORIDE: 150; 5; 450 INJECTION, SOLUTION INTRAVENOUS at 19:38

## 2023-10-27 ASSESSMENT — PAIN SCALES - GENERAL: PAINLEVEL_OUTOF10: 0

## 2023-10-27 NOTE — PLAN OF CARE
Problem: Discharge Planning  Goal: Discharge to home or other facility with appropriate resources  Outcome: Progressing  Flowsheets (Taken 10/26/2023 2004)  Discharge to home or other facility with appropriate resources: Identify barriers to discharge with patient and caregiver     Problem: Pain  Goal: Verbalizes/displays adequate comfort level or baseline comfort level  Outcome: Progressing

## 2023-10-28 VITALS
HEART RATE: 63 BPM | HEIGHT: 78 IN | SYSTOLIC BLOOD PRESSURE: 119 MMHG | OXYGEN SATURATION: 98 % | DIASTOLIC BLOOD PRESSURE: 84 MMHG | BODY MASS INDEX: 28.46 KG/M2 | TEMPERATURE: 98.6 F | WEIGHT: 246 LBS | RESPIRATION RATE: 16 BRPM

## 2023-10-28 LAB
BACTERIA SPEC CULT: ABNORMAL
Lab: ABNORMAL

## 2023-10-28 PROCEDURE — 2580000003 HC RX 258: Performed by: SURGERY

## 2023-10-28 PROCEDURE — 6360000002 HC RX W HCPCS: Performed by: SURGERY

## 2023-10-28 PROCEDURE — 2500000003 HC RX 250 WO HCPCS: Performed by: SURGERY

## 2023-10-28 PROCEDURE — 99232 SBSQ HOSP IP/OBS MODERATE 35: CPT | Performed by: SURGERY

## 2023-10-28 RX ADMIN — POTASSIUM CHLORIDE, DEXTROSE MONOHYDRATE AND SODIUM CHLORIDE: 150; 5; 450 INJECTION, SOLUTION INTRAVENOUS at 05:51

## 2023-10-28 RX ADMIN — SODIUM CHLORIDE, PRESERVATIVE FREE 10 ML: 5 INJECTION INTRAVENOUS at 09:45

## 2023-10-28 RX ADMIN — ENOXAPARIN SODIUM 30 MG: 100 INJECTION SUBCUTANEOUS at 09:43

## 2023-10-28 NOTE — PLAN OF CARE
Problem: Discharge Planning  Goal: Discharge to home or other facility with appropriate resources  10/28/2023 1220 by Ni Farfan RN  Outcome: Adequate for Discharge  10/28/2023 1219 by Ni Farfan RN  Outcome: Adequate for Discharge  10/28/2023 0217 by Moon Nicole RN  Outcome: Progressing     Problem: Pain  Goal: Verbalizes/displays adequate comfort level or baseline comfort level  10/28/2023 1220 by Ni Farfan RN  Outcome: Adequate for Discharge  10/28/2023 1219 by Ni Farfan RN  Outcome: Adequate for Discharge  10/28/2023 0217 by Moon Nicole RN  Outcome: Progressing

## 2023-10-29 LAB
BACTERIA SPEC CULT: NORMAL
Lab: NORMAL

## 2023-10-31 LAB
BACTERIA SPEC CULT: NORMAL
Lab: NORMAL

## 2023-10-31 NOTE — DISCHARGE SUMMARY
This is discharge summary for Anthony Foy, patient's YOB: 1970    Brief Hospital course: Patient was admitted to the hospital with signs of a small bowel obstruction. This was managed conservatively with bowel rest and NG tube insertion and IV fluid hydration. Patient responded conservatively. Diet was advanced. Patient did well. Patient was discharged home with follow-up with Dr. Da Cowan in 10 days.

## 2023-11-02 ENCOUNTER — TRANSCRIBE ORDERS (OUTPATIENT)
Facility: HOSPITAL | Age: 53
End: 2023-11-02

## 2023-11-02 ENCOUNTER — HOSPITAL ENCOUNTER (OUTPATIENT)
Facility: HOSPITAL | Age: 53
Discharge: HOME | End: 2023-11-02
Payer: COMMERCIAL

## 2023-11-02 DIAGNOSIS — N20.0 KIDNEY STONE: ICD-10-CM

## 2023-11-02 DIAGNOSIS — N20.0 KIDNEY STONE: Primary | ICD-10-CM

## 2023-11-02 PROCEDURE — 74018 RADEX ABDOMEN 1 VIEW: CPT

## 2023-11-06 ENCOUNTER — OFFICE VISIT (OUTPATIENT)
Age: 53
End: 2023-11-06
Payer: COMMERCIAL

## 2023-11-06 VITALS
HEART RATE: 61 BPM | OXYGEN SATURATION: 96 % | DIASTOLIC BLOOD PRESSURE: 69 MMHG | BODY MASS INDEX: 29.04 KG/M2 | WEIGHT: 251 LBS | HEIGHT: 78 IN | RESPIRATION RATE: 18 BRPM | TEMPERATURE: 97.4 F | SYSTOLIC BLOOD PRESSURE: 103 MMHG

## 2023-11-06 DIAGNOSIS — K56.609 SBO (SMALL BOWEL OBSTRUCTION) (HCC): Primary | ICD-10-CM

## 2023-11-06 PROCEDURE — 99213 OFFICE O/P EST LOW 20 MIN: CPT | Performed by: SURGERY

## 2023-11-06 ASSESSMENT — PATIENT HEALTH QUESTIONNAIRE - PHQ9
SUM OF ALL RESPONSES TO PHQ QUESTIONS 1-9: 0
1. LITTLE INTEREST OR PLEASURE IN DOING THINGS: 0
SUM OF ALL RESPONSES TO PHQ QUESTIONS 1-9: 0
SUM OF ALL RESPONSES TO PHQ QUESTIONS 1-9: 0
2. FEELING DOWN, DEPRESSED OR HOPELESS: 0
SUM OF ALL RESPONSES TO PHQ9 QUESTIONS 1 & 2: 0
SUM OF ALL RESPONSES TO PHQ QUESTIONS 1-9: 0

## 2023-11-09 NOTE — PROGRESS NOTES
Vascular History and Physical    Patient: Oralia Perez  MRN: [id number]    YOB: 1970  Age: 48 y.o. Sex: male     Chief Complaint:  Chief Complaint   Patient presents with    New Patient     Small bowel obstruction        History of Present Illness: Oralia Perez is a 48 y.o. very pleasant gentleman is here today follow-up recent hospitalization for small bowel obstruction. Patient doing well. Tolerating diet having bowel movement. Social History:  Social Connections: Not on file       Past Medical History:  Past Medical History:   Diagnosis Date    Alopecia 9/18/2021    Diabetes mellitus ([address])     Epigastric pain 9/18/2021    Hemorrhage of anus and rectum 9/18/2021    Hemorrhoids 6/51/6306    Renal colic 7/90/0188       Surgical History:  No past surgical history on file. Allergies: Allergies   Allergen Reactions    Acetaminophen Other (See Comments)     Other reaction(s):Drowsiness    Ibuprofen Other (See Comments)     Other reaction(s): Drowsiness       Current Meds:  Current Outpatient Medications   Medication Sig Dispense Refill    glucose monitoring (FREESTYLE FREEDOM) kit 1 kit by Does not apply route daily 1 kit 0    blood glucose monitor strips Test 2 times a day & as needed for symptoms of irregular blood glucose. Dispense sufficient amount for indicated testing frequency plus additional to accommodate PRN testing needs. 60 strip 2    Lancets MISC 1 each by Does not apply route 2 times daily 300 each 1    metFORMIN (GLUCOPHAGE) 500 MG tablet Take 1 tablet by mouth 2 times daily (with meals) 180 tablet 1    montelukast (SINGULAIR) 10 MG tablet Take 1 tablet by mouth daily      tamsulosin (FLOMAX) 0.4 MG capsule Take 1 capsule by mouth daily (Patient not taking: Reported on 11/6/2023) 30 capsule 3     No current facility-administered medications for this visit. Review of Systems:  I reviewed the rest of organ systems personally and they are negative.   Pertinent

## (undated) DEVICE — WASH CLOTH INCONT LO LINT PREM 12X13 IN LF DISP

## (undated) DEVICE — SPONGE GZ W4XL4IN COT 12 PLY TYP VII WVN C FLD DSGN

## (undated) DEVICE — TUBING, SUCTION, 9/32" X 10', STRAIGHT: Brand: MEDLINE

## (undated) DEVICE — PAD,PREPPING,CUFFED,24X48,7",NONSTERILE: Brand: MEDLINE

## (undated) DEVICE — SOLUTION IRRIG 1000ML STRL H2O USP PLAS POUR BTL

## (undated) DEVICE — 1200CC GUARDIAN II: Brand: GUARDIAN

## (undated) DEVICE — JELLY,LUBE,STERILE,FLIP TOP,TUBE,4-OZ: Brand: MEDLINE

## (undated) DEVICE — GLOVE ORANGE PI 7 1/2   MSG9075